# Patient Record
Sex: MALE | Race: WHITE | NOT HISPANIC OR LATINO | Employment: FULL TIME | URBAN - METROPOLITAN AREA
[De-identification: names, ages, dates, MRNs, and addresses within clinical notes are randomized per-mention and may not be internally consistent; named-entity substitution may affect disease eponyms.]

---

## 2019-08-08 ENCOUNTER — OFFICE VISIT (OUTPATIENT)
Dept: FAMILY MEDICINE CLINIC | Facility: CLINIC | Age: 29
End: 2019-08-08
Payer: COMMERCIAL

## 2019-08-08 VITALS
BODY MASS INDEX: 18.51 KG/M2 | HEIGHT: 66 IN | SYSTOLIC BLOOD PRESSURE: 140 MMHG | HEART RATE: 115 BPM | OXYGEN SATURATION: 98 % | DIASTOLIC BLOOD PRESSURE: 86 MMHG | TEMPERATURE: 97.5 F | WEIGHT: 115.2 LBS

## 2019-08-08 DIAGNOSIS — F41.0 PANIC DISORDER: ICD-10-CM

## 2019-08-08 DIAGNOSIS — F41.1 GAD (GENERALIZED ANXIETY DISORDER): Primary | ICD-10-CM

## 2019-08-08 DIAGNOSIS — F17.200 TOBACCO USE DISORDER: ICD-10-CM

## 2019-08-08 DIAGNOSIS — F32.0 CURRENT MILD EPISODE OF MAJOR DEPRESSIVE DISORDER WITHOUT PRIOR EPISODE (HCC): ICD-10-CM

## 2019-08-08 DIAGNOSIS — R11.0 NAUSEA: ICD-10-CM

## 2019-08-08 PROCEDURE — 99204 OFFICE O/P NEW MOD 45 MIN: CPT | Performed by: FAMILY MEDICINE

## 2019-08-08 PROCEDURE — 3008F BODY MASS INDEX DOCD: CPT | Performed by: FAMILY MEDICINE

## 2019-08-08 RX ORDER — SERTRALINE HYDROCHLORIDE 100 MG/1
100 TABLET, FILM COATED ORAL DAILY
Refills: 0 | COMMUNITY
Start: 2019-06-10 | End: 2019-08-08 | Stop reason: SDUPTHER

## 2019-08-08 RX ORDER — ONDANSETRON 4 MG/1
4 TABLET, FILM COATED ORAL EVERY 6 HOURS PRN
Refills: 0 | COMMUNITY
Start: 2019-06-11 | End: 2019-09-05 | Stop reason: SDUPTHER

## 2019-08-08 RX ORDER — PROPRANOLOL HCL 60 MG
60 CAPSULE, EXTENDED RELEASE 24HR ORAL DAILY
Qty: 30 CAPSULE | Refills: 0 | Status: SHIPPED | OUTPATIENT
Start: 2019-08-08 | End: 2019-09-05 | Stop reason: SDUPTHER

## 2019-08-08 RX ORDER — GABAPENTIN 300 MG/1
300 CAPSULE ORAL 3 TIMES DAILY
Refills: 0 | COMMUNITY
Start: 2019-06-10 | End: 2019-08-08 | Stop reason: SDUPTHER

## 2019-08-08 RX ORDER — PROPRANOLOL HCL 60 MG
60 CAPSULE, EXTENDED RELEASE 24HR ORAL DAILY
Refills: 0 | COMMUNITY
Start: 2019-06-10 | End: 2019-08-08 | Stop reason: SDUPTHER

## 2019-08-08 RX ORDER — SERTRALINE HYDROCHLORIDE 100 MG/1
100 TABLET, FILM COATED ORAL DAILY
Qty: 30 TABLET | Refills: 0 | Status: SHIPPED | OUTPATIENT
Start: 2019-08-08 | End: 2019-09-05 | Stop reason: SDUPTHER

## 2019-08-08 RX ORDER — GABAPENTIN 300 MG/1
300 CAPSULE ORAL 3 TIMES DAILY
Qty: 90 CAPSULE | Refills: 0 | Status: SHIPPED | OUTPATIENT
Start: 2019-08-08 | End: 2019-09-05 | Stop reason: SDUPTHER

## 2019-08-08 NOTE — PROGRESS NOTES
Kelly Bajwa 1990 male MRN: [id number]    Family Medicine New Patient    ASSESSMENT/PLAN  Problem List Items Addressed This Visit        Other    LEN (generalized anxiety disorder) - Primary    Relevant Medications    gabapentin (NEURONTIN) 300 mg capsule    propranolol (INDERAL LA) 60 mg 24 hr capsule    sertraline (ZOLOFT) 100 mg tablet    Panic disorder    Relevant Medications    gabapentin (NEURONTIN) 300 mg capsule    propranolol (INDERAL LA) 60 mg 24 hr capsule    sertraline (ZOLOFT) 100 mg tablet    Current mild episode of major depressive disorder without prior episode (HCC)    Relevant Medications    gabapentin (NEURONTIN) 300 mg capsule    propranolol (INDERAL LA) 60 mg 24 hr capsule    sertraline (ZOLOFT) 100 mg tablet    Nausea     Had been using zofran as needed for nausea associated with panic attack         Tobacco use disorder     Encouraged on cessation               Refilled medications as prescribed by [facility]  Paperwork reviewed from his visit there  Had blood work done 6/2019 all normal  Will see him back in 1 month for med check  He will schedule intake with [facility] as well  No future appointments  SUBJECTIVE  CC: No chief complaint on file  HPI:  Kelly Bajwa is a 34 y o  male who presents for establishing care  PMH: anxiety, depression, panic disorder  PSH: denies    Current smoker- 1/2 to 1 PPD for 14 years    Was seen and treated at [facility] clinic on 6/619 for severe depression and SI  States he is doing well since his discharge on medications     HPI    Review of Systems   Constitutional: Negative for chills and fever  HENT: Negative for congestion, postnasal drip, rhinorrhea and sinus pain  Eyes: Negative for photophobia and visual disturbance  Respiratory: Negative for cough and shortness of breath  Cardiovascular: Negative for chest pain, palpitations and leg swelling     Gastrointestinal: Negative for abdominal pain, constipation, diarrhea, nausea and vomiting  Genitourinary: Negative for difficulty urinating and dysuria  Musculoskeletal: Negative for arthralgias and myalgias  Skin: Negative for rash  Neurological: Negative for dizziness and syncope  Historical Information   The patient history was reviewed as follows:    Past Medical History:   Diagnosis Date    Eczema      History reviewed  No pertinent surgical history  History reviewed  No pertinent family history  Social History   Social History     Substance and Sexual Activity   Alcohol Use Not Currently    Frequency: Never     Social History     Substance and Sexual Activity   Drug Use Not Currently    Types: Marijuana     Social History     Tobacco Use   Smoking Status Current Every Day Smoker    Packs/day: 1 00   Smokeless Tobacco Never Used   Tobacco Comment    Smoker/tobacco use - As per [employer]        Medications:     Current Outpatient Medications:     gabapentin (NEURONTIN) 300 mg capsule, Take 1 capsule (300 mg total) by mouth 3 (three) times a day for 30 days, Disp: 90 capsule, Rfl: 0    ondansetron (ZOFRAN) 4 mg tablet, Take 4 mg by mouth every 6 (six) hours as needed, Disp: , Rfl: 0    propranolol (INDERAL LA) 60 mg 24 hr capsule, Take 1 capsule (60 mg total) by mouth daily for 30 days, Disp: 30 capsule, Rfl: 0    sertraline (ZOLOFT) 100 mg tablet, Take 1 tablet (100 mg total) by mouth daily for 30 days, Disp: 30 tablet, Rfl: 0  No Known Allergies    OBJECTIVE    Vitals:   Vitals:    08/08/19 1150   BP: 140/86   BP Location: Left arm   Patient Position: Sitting   Cuff Size: Standard   Pulse: (!) 115   Temp: 97 5 °F (36 4 °C)   TempSrc: Tympanic   SpO2: 98%   Weight: 52 3 kg (115 lb 3 2 oz)   Height: 5' 6" (1 676 m)           Physical Exam   Constitutional: He is oriented to person, place, and time  He appears well-developed and well-nourished  HENT:   Head: Normocephalic and atraumatic     Right Ear: External ear normal    Left Ear: External ear normal    Mouth/Throat: Oropharynx is clear and moist    Eyes: Pupils are equal, round, and reactive to light  Conjunctivae and EOM are normal    Neck: Normal range of motion  Neck supple  Cardiovascular: Normal rate, regular rhythm and normal heart sounds  No murmur heard  Pulmonary/Chest: Effort normal and breath sounds normal  No respiratory distress  He has no wheezes  Abdominal: Soft  Bowel sounds are normal  He exhibits no distension  Musculoskeletal: Normal range of motion  He exhibits no edema  Neurological: He is alert and oriented to person, place, and time  Skin: Skin is warm and dry  Psychiatric: He has a normal mood and affect   His behavior is normal                 Gloria Reina DO    8/8/2019

## 2019-09-05 ENCOUNTER — OFFICE VISIT (OUTPATIENT)
Dept: FAMILY MEDICINE CLINIC | Facility: CLINIC | Age: 29
End: 2019-09-05
Payer: COMMERCIAL

## 2019-09-05 VITALS
BODY MASS INDEX: 18.84 KG/M2 | OXYGEN SATURATION: 98 % | SYSTOLIC BLOOD PRESSURE: 122 MMHG | HEIGHT: 66 IN | HEART RATE: 87 BPM | WEIGHT: 117.2 LBS | TEMPERATURE: 97.8 F | DIASTOLIC BLOOD PRESSURE: 84 MMHG

## 2019-09-05 DIAGNOSIS — F41.1 GAD (GENERALIZED ANXIETY DISORDER): ICD-10-CM

## 2019-09-05 DIAGNOSIS — F41.0 PANIC DISORDER: ICD-10-CM

## 2019-09-05 DIAGNOSIS — F17.200 TOBACCO USE DISORDER: ICD-10-CM

## 2019-09-05 DIAGNOSIS — Z23 NEED FOR 23-POLYVALENT PNEUMOCOCCAL POLYSACCHARIDE VACCINE: ICD-10-CM

## 2019-09-05 DIAGNOSIS — F32.0 CURRENT MILD EPISODE OF MAJOR DEPRESSIVE DISORDER WITHOUT PRIOR EPISODE (HCC): Primary | ICD-10-CM

## 2019-09-05 DIAGNOSIS — R11.0 NAUSEA: ICD-10-CM

## 2019-09-05 PROCEDURE — 99214 OFFICE O/P EST MOD 30 MIN: CPT | Performed by: FAMILY MEDICINE

## 2019-09-05 PROCEDURE — 3008F BODY MASS INDEX DOCD: CPT | Performed by: FAMILY MEDICINE

## 2019-09-05 PROCEDURE — 90471 IMMUNIZATION ADMIN: CPT | Performed by: FAMILY MEDICINE

## 2019-09-05 PROCEDURE — 90732 PPSV23 VACC 2 YRS+ SUBQ/IM: CPT | Performed by: FAMILY MEDICINE

## 2019-09-05 RX ORDER — DESOXIMETASONE 2.5 MG/G
CREAM TOPICAL 2 TIMES DAILY
COMMUNITY

## 2019-09-05 RX ORDER — SERTRALINE HYDROCHLORIDE 100 MG/1
100 TABLET, FILM COATED ORAL DAILY
Qty: 90 TABLET | Refills: 0 | Status: SHIPPED | OUTPATIENT
Start: 2019-09-05 | End: 2019-12-05 | Stop reason: SDUPTHER

## 2019-09-05 RX ORDER — PROPRANOLOL HCL 60 MG
60 CAPSULE, EXTENDED RELEASE 24HR ORAL DAILY
Qty: 90 CAPSULE | Refills: 0 | Status: SHIPPED | OUTPATIENT
Start: 2019-09-05 | End: 2019-12-05 | Stop reason: SDUPTHER

## 2019-09-05 RX ORDER — ONDANSETRON 4 MG/1
4 TABLET, FILM COATED ORAL EVERY 6 HOURS PRN
Qty: 20 TABLET | Refills: 0 | Status: SHIPPED | OUTPATIENT
Start: 2019-09-05 | End: 2019-12-05 | Stop reason: SDUPTHER

## 2019-09-05 RX ORDER — GABAPENTIN 300 MG/1
300 CAPSULE ORAL 3 TIMES DAILY
Qty: 270 CAPSULE | Refills: 0 | Status: SHIPPED | OUTPATIENT
Start: 2019-09-05 | End: 2019-12-05 | Stop reason: SDUPTHER

## 2019-09-05 NOTE — PROGRESS NOTES
Eduard Coronado 1990 male MRN: [id number]    Family Medicine Follow-up Visit    ASSESSMENT/PLAN  Problem List Items Addressed This Visit        Other    LEN (generalized anxiety disorder)    Relevant Medications    sertraline (ZOLOFT) 100 mg tablet    propranolol (INDERAL LA) 60 mg 24 hr capsule    gabapentin (NEURONTIN) 300 mg capsule    Panic disorder    Relevant Medications    sertraline (ZOLOFT) 100 mg tablet    propranolol (INDERAL LA) 60 mg 24 hr capsule    gabapentin (NEURONTIN) 300 mg capsule    Current mild episode of major depressive disorder without prior episode (HCC) - Primary    Relevant Medications    sertraline (ZOLOFT) 100 mg tablet    propranolol (INDERAL LA) 60 mg 24 hr capsule    gabapentin (NEURONTIN) 300 mg capsule    Nausea    Relevant Medications    ondansetron (ZOFRAN) 4 mg tablet    Tobacco use disorder    Need for 23-polyvalent pneumococcal polysaccharide vaccine    Relevant Orders    PNEUMOCOCCAL POLYSACCHARIDE VACCINE 23-VALENT =>3YO SQ IM (Completed)          Patient is doing very well now that he is back on medications as prescribed when he was in  [facility]  We are continuing prescriptions until he can be seen by psych  He has intake apt with [facility] later this month  Refills provided today  His blood work is UTD  He will work on cutting back smoking  If GI symptoms persist he will RTC sooner  Otherwise follow up in 3 months  Future Appointments   Date Time Provider Mikey Clifton   9/23/2019  2:00 PM Ricardo Vasquez [facility]   12/5/2019 11:30 AM DO [facility]          SUBJECTIVE  CC: Follow-up (4 week )      HPI:  Eduard Coronado is a 34 y o  male who presents for follow up  Doing well on medication- he feels much better than before  Has apt with [facility] later this month  Still using zofran occasionally but feeling better        HPI    Review of Systems   Constitutional: Negative for chills and fever     HENT: Negative for congestion, postnasal drip, rhinorrhea and sinus pain  Eyes: Negative for photophobia and visual disturbance  Respiratory: Negative for cough and shortness of breath  Cardiovascular: Negative for chest pain, palpitations and leg swelling  Gastrointestinal: Positive for nausea  Negative for abdominal pain, constipation, diarrhea and vomiting  Genitourinary: Negative for difficulty urinating and dysuria  Musculoskeletal: Negative for arthralgias and myalgias  Skin: Negative for rash  Neurological: Negative for dizziness and syncope  Historical Information   The patient history was reviewed as follows:    Past Medical History:   Diagnosis Date    Eczema      History reviewed  No pertinent surgical history  History reviewed  No pertinent family history     Social History   Social History     Substance and Sexual Activity   Alcohol Use Not Currently    Frequency: Never     Social History     Substance and Sexual Activity   Drug Use Not Currently    Types: Marijuana     Social History     Tobacco Use   Smoking Status Current Every Day Smoker    Packs/day: 1 00   Smokeless Tobacco Never Used   Tobacco Comment    Smoker/tobacco use - As per [employer]        Medications:     Current Outpatient Medications:     desoximetasone (TOPICORT) 0 25 % cream, Apply topically 2 (two) times a day, Disp: , Rfl:     gabapentin (NEURONTIN) 300 mg capsule, Take 1 capsule (300 mg total) by mouth 3 (three) times a day for 90 days, Disp: 270 capsule, Rfl: 0    ondansetron (ZOFRAN) 4 mg tablet, Take 1 tablet (4 mg total) by mouth every 6 (six) hours as needed for nausea or vomiting, Disp: 20 tablet, Rfl: 0    propranolol (INDERAL LA) 60 mg 24 hr capsule, Take 1 capsule (60 mg total) by mouth daily for 90 days, Disp: 90 capsule, Rfl: 0    sertraline (ZOLOFT) 100 mg tablet, Take 1 tablet (100 mg total) by mouth daily for 90 days, Disp: 90 tablet, Rfl: 0  No Known Allergies    OBJECTIVE    Vitals: Vitals:    09/05/19 1158   BP: 122/84   BP Location: Left arm   Patient Position: Sitting   Cuff Size: Standard   Pulse: 87   Temp: 97 8 °F (36 6 °C)   TempSrc: Tympanic   SpO2: 98%   Weight: 53 2 kg (117 lb 3 2 oz)   Height: 5' 6" (1 676 m)           Physical Exam   Constitutional: He is oriented to person, place, and time  He appears well-developed and well-nourished  HENT:   Head: Normocephalic and atraumatic  Right Ear: External ear normal    Left Ear: External ear normal    Mouth/Throat: Oropharynx is clear and moist    Eyes: Pupils are equal, round, and reactive to light  Conjunctivae and EOM are normal    Neck: Normal range of motion  Neck supple  Cardiovascular: Normal rate, regular rhythm and normal heart sounds  No murmur heard  Pulmonary/Chest: Effort normal and breath sounds normal  No respiratory distress  He has no wheezes  Abdominal: Soft  Bowel sounds are normal  He exhibits no distension  Musculoskeletal: Normal range of motion  He exhibits no edema  Neurological: He is alert and oriented to person, place, and time  Skin: Skin is warm and dry  Psychiatric: He has a normal mood and affect   His behavior is normal           DO Paul    9/5/2019

## 2019-09-23 ENCOUNTER — SOCIAL WORK (OUTPATIENT)
Dept: BEHAVIORAL/MENTAL HEALTH CLINIC | Facility: CLINIC | Age: 29
End: 2019-09-23
Payer: COMMERCIAL

## 2019-09-23 ENCOUNTER — DOCUMENTATION (OUTPATIENT)
Dept: BEHAVIORAL/MENTAL HEALTH CLINIC | Facility: CLINIC | Age: 29
End: 2019-09-23

## 2019-09-23 DIAGNOSIS — F33.1 MODERATE RECURRENT MAJOR DEPRESSION (HCC): Primary | ICD-10-CM

## 2019-09-23 DIAGNOSIS — F41.1 GAD (GENERALIZED ANXIETY DISORDER): ICD-10-CM

## 2019-09-23 PROCEDURE — 90834 PSYTX W PT 45 MINUTES: CPT | Performed by: SOCIAL WORKER
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
## 2019-09-23 NOTE — PROGRESS NOTES
This writer consulted with PCP Dr Tori Guzman regarding her willingness to continue to prescribe Misael's medications at this time  Dr Tori Guzman communicated that after review of his medications she is comfortable managing his medication as long as his mental health symptoms are well controlled  Agreed to be in contact regarding psychiatry referral in the future if needed

## 2019-12-05 ENCOUNTER — OFFICE VISIT (OUTPATIENT)
Dept: FAMILY MEDICINE CLINIC | Facility: CLINIC | Age: 29
End: 2019-12-05
Payer: COMMERCIAL

## 2019-12-05 VITALS
DIASTOLIC BLOOD PRESSURE: 80 MMHG | BODY MASS INDEX: 18.48 KG/M2 | OXYGEN SATURATION: 96 % | SYSTOLIC BLOOD PRESSURE: 128 MMHG | WEIGHT: 115 LBS | HEART RATE: 86 BPM | HEIGHT: 66 IN | TEMPERATURE: 96.8 F

## 2019-12-05 DIAGNOSIS — F41.1 GAD (GENERALIZED ANXIETY DISORDER): ICD-10-CM

## 2019-12-05 DIAGNOSIS — Z11.4 SCREENING FOR HUMAN IMMUNODEFICIENCY VIRUS: ICD-10-CM

## 2019-12-05 DIAGNOSIS — F33.1 MODERATE RECURRENT MAJOR DEPRESSION (HCC): ICD-10-CM

## 2019-12-05 DIAGNOSIS — Z13.6 SCREENING FOR CARDIOVASCULAR CONDITION: ICD-10-CM

## 2019-12-05 DIAGNOSIS — F32.0 CURRENT MILD EPISODE OF MAJOR DEPRESSIVE DISORDER WITHOUT PRIOR EPISODE (HCC): Primary | ICD-10-CM

## 2019-12-05 DIAGNOSIS — R11.0 NAUSEA: ICD-10-CM

## 2019-12-05 DIAGNOSIS — F41.0 PANIC DISORDER: ICD-10-CM

## 2019-12-05 DIAGNOSIS — F17.200 TOBACCO USE DISORDER: ICD-10-CM

## 2019-12-05 PROCEDURE — 99214 OFFICE O/P EST MOD 30 MIN: CPT | Performed by: FAMILY MEDICINE

## 2019-12-05 PROCEDURE — 3008F BODY MASS INDEX DOCD: CPT | Performed by: FAMILY MEDICINE

## 2019-12-05 RX ORDER — ONDANSETRON 4 MG/1
4 TABLET, FILM COATED ORAL EVERY 6 HOURS PRN
Qty: 20 TABLET | Refills: 0 | Status: SHIPPED | OUTPATIENT
Start: 2019-12-05 | End: 2020-03-12 | Stop reason: SDUPTHER

## 2019-12-05 RX ORDER — PROPRANOLOL HCL 60 MG
60 CAPSULE, EXTENDED RELEASE 24HR ORAL DAILY
Qty: 90 CAPSULE | Refills: 0 | Status: SHIPPED | OUTPATIENT
Start: 2019-12-05 | End: 2020-03-12 | Stop reason: SDUPTHER

## 2019-12-05 RX ORDER — GABAPENTIN 300 MG/1
300 CAPSULE ORAL 3 TIMES DAILY
Qty: 270 CAPSULE | Refills: 0 | Status: SHIPPED | OUTPATIENT
Start: 2019-12-05 | End: 2020-03-12 | Stop reason: SDUPTHER

## 2019-12-05 RX ORDER — SERTRALINE HYDROCHLORIDE 100 MG/1
100 TABLET, FILM COATED ORAL DAILY
Qty: 90 TABLET | Refills: 0 | Status: SHIPPED | OUTPATIENT
Start: 2019-12-05 | End: 2020-03-12 | Stop reason: SDUPTHER

## 2019-12-05 NOTE — PROGRESS NOTES
Jihan Phylicia 1990 male MRN: [id number]    Family Medicine Follow-up Visit    ASSESSMENT/PLAN  Problem List Items Addressed This Visit        Other    LEN (generalized anxiety disorder)    Relevant Medications    sertraline (ZOLOFT) 100 mg tablet    gabapentin (NEURONTIN) 300 mg capsule    propranolol (INDERAL LA) 60 mg 24 hr capsule    Other Relevant Orders    CBC and differential    Comprehensive metabolic panel    Lipid panel    TSH, 3rd generation with Free T4 reflex    UA (URINE) with reflex to Scope    Panic disorder    Relevant Medications    sertraline (ZOLOFT) 100 mg tablet    gabapentin (NEURONTIN) 300 mg capsule    propranolol (INDERAL LA) 60 mg 24 hr capsule    Other Relevant Orders    CBC and differential    Comprehensive metabolic panel    Lipid panel    TSH, 3rd generation with Free T4 reflex    UA (URINE) with reflex to Scope    Current mild episode of major depressive disorder without prior episode (HCC) - Primary    Relevant Medications    sertraline (ZOLOFT) 100 mg tablet    gabapentin (NEURONTIN) 300 mg capsule    propranolol (INDERAL LA) 60 mg 24 hr capsule    Other Relevant Orders    CBC and differential    Comprehensive metabolic panel    Lipid panel    TSH, 3rd generation with Free T4 reflex    UA (URINE) with reflex to Scope    Nausea    Relevant Medications    ondansetron (ZOFRAN) 4 mg tablet    Tobacco use disorder    Relevant Orders    CBC and differential    Comprehensive metabolic panel    Lipid panel    TSH, 3rd generation with Free T4 reflex    UA (URINE) with reflex to Scope    Moderate recurrent major depression (HCC)    Relevant Medications    sertraline (ZOLOFT) 100 mg tablet    Screening for cardiovascular condition    Relevant Orders    CBC and differential    Comprehensive metabolic panel    Lipid panel    TSH, 3rd generation with Free T4 reflex    UA (URINE) with reflex to Scope      Other Visit Diagnoses     Screening for human immunodeficiency virus        Relevant Orders    Human Immunodeficiency Virus 1/2 Antigen / Antibody ( Fourth Generation) with Reflex Testing               Follow up in 6 months for CP and get lab work done prior to next visit  Future Appointments   Date Time Provider Mikey Clifton   6/11/2020 10:00 AM DO [facility]          SUBJECTIVE  CC: Follow-up (3 month medication )      HPI:  Carly Alberto is a 34 y o  male who presents for follow up  He is doing well on medications, taking as prescribed  Spoke with Renny Robertson since our last visit and that went well  Tobacco use- still using about a pack per day  Has used patch and gum before which did not work well  HPI    Review of Systems   Constitutional: Negative for chills and fever  HENT: Negative for congestion, postnasal drip, rhinorrhea and sinus pain  Eyes: Negative for photophobia and visual disturbance  Respiratory: Negative for cough and shortness of breath  Cardiovascular: Negative for chest pain, palpitations and leg swelling  Gastrointestinal: Negative for abdominal pain, constipation, diarrhea, nausea and vomiting  Genitourinary: Negative for difficulty urinating and dysuria  Musculoskeletal: Negative for arthralgias and myalgias  Skin: Negative for rash  Neurological: Negative for dizziness and syncope  Historical Information   The patient history was reviewed as follows:    Past Medical History:   Diagnosis Date    Anxiety     Chicken pox     Depression     Eczema      History reviewed  No pertinent surgical history  History reviewed  No pertinent family history     Social History   Social History     Substance and Sexual Activity   Alcohol Use Not Currently    Frequency: Never     Social History     Substance and Sexual Activity   Drug Use Not Currently    Types: Marijuana     Social History     Tobacco Use   Smoking Status Current Every Day Smoker    Packs/day: 1 00   Smokeless Tobacco Never Used   Tobacco Comment    Smoker/tobacco use - As per [employer]        Medications:     Current Outpatient Medications:     desoximetasone (TOPICORT) 0 25 % cream, Apply topically 2 (two) times a day, Disp: , Rfl:     gabapentin (NEURONTIN) 300 mg capsule, Take 1 capsule (300 mg total) by mouth 3 (three) times a day, Disp: 270 capsule, Rfl: 0    propranolol (INDERAL LA) 60 mg 24 hr capsule, Take 1 capsule (60 mg total) by mouth daily, Disp: 90 capsule, Rfl: 0    sertraline (ZOLOFT) 100 mg tablet, Take 1 tablet (100 mg total) by mouth daily, Disp: 90 tablet, Rfl: 0    ondansetron (ZOFRAN) 4 mg tablet, Take 1 tablet (4 mg total) by mouth every 6 (six) hours as needed for nausea or vomiting, Disp: 20 tablet, Rfl: 0  No Known Allergies    OBJECTIVE    Vitals:   Vitals:    12/05/19 1118   BP: 128/80   BP Location: Left arm   Patient Position: Sitting   Cuff Size: Large   Pulse: 86   Temp: (!) 96 8 °F (36 °C)   TempSrc: Tympanic   SpO2: 96%   Weight: 52 2 kg (115 lb)   Height: 5' 6" (1 676 m)           Physical Exam   Constitutional: He is oriented to person, place, and time  He appears well-developed and well-nourished  HENT:   Head: Normocephalic and atraumatic  Right Ear: External ear normal    Left Ear: External ear normal    Mouth/Throat: Oropharynx is clear and moist    Eyes: Pupils are equal, round, and reactive to light  Conjunctivae and EOM are normal    Neck: Normal range of motion  Neck supple  Cardiovascular: Normal rate, regular rhythm and normal heart sounds  No murmur heard  Pulmonary/Chest: Effort normal and breath sounds normal  No respiratory distress  He has no wheezes  Abdominal: Soft  Bowel sounds are normal  He exhibits no distension  Musculoskeletal: Normal range of motion  He exhibits no edema  Neurological: He is alert and oriented to person, place, and time  Skin: Skin is warm and dry  Psychiatric: He has a normal mood and affect   His behavior is normal               [facility] [address] [place] [address],     12/5/2019

## 2020-03-12 DIAGNOSIS — R11.0 NAUSEA: ICD-10-CM

## 2020-03-12 DIAGNOSIS — F41.1 GAD (GENERALIZED ANXIETY DISORDER): ICD-10-CM

## 2020-03-12 DIAGNOSIS — F41.0 PANIC DISORDER: ICD-10-CM

## 2020-03-12 DIAGNOSIS — F32.0 CURRENT MILD EPISODE OF MAJOR DEPRESSIVE DISORDER WITHOUT PRIOR EPISODE (HCC): ICD-10-CM

## 2020-03-12 RX ORDER — PROPRANOLOL HCL 60 MG
60 CAPSULE, EXTENDED RELEASE 24HR ORAL DAILY
Qty: 90 CAPSULE | Refills: 1 | Status: SHIPPED | OUTPATIENT
Start: 2020-03-12 | End: 2020-12-01 | Stop reason: SDUPTHER

## 2020-03-12 RX ORDER — GABAPENTIN 300 MG/1
300 CAPSULE ORAL 3 TIMES DAILY
Qty: 270 CAPSULE | Refills: 1 | Status: SHIPPED | OUTPATIENT
Start: 2020-03-12 | End: 2020-12-01 | Stop reason: SDUPTHER

## 2020-03-12 RX ORDER — ONDANSETRON 4 MG/1
4 TABLET, FILM COATED ORAL EVERY 6 HOURS PRN
Qty: 20 TABLET | Refills: 0 | Status: SHIPPED | OUTPATIENT
Start: 2020-03-12 | End: 2020-12-01 | Stop reason: SDUPTHER

## 2020-03-12 RX ORDER — SERTRALINE HYDROCHLORIDE 100 MG/1
100 TABLET, FILM COATED ORAL DAILY
Qty: 90 TABLET | Refills: 1 | Status: SHIPPED | OUTPATIENT
Start: 2020-03-12 | End: 2020-12-01 | Stop reason: SDUPTHER

## 2020-12-01 DIAGNOSIS — F41.0 PANIC DISORDER: ICD-10-CM

## 2020-12-01 DIAGNOSIS — F41.1 GAD (GENERALIZED ANXIETY DISORDER): ICD-10-CM

## 2020-12-01 DIAGNOSIS — F32.0 CURRENT MILD EPISODE OF MAJOR DEPRESSIVE DISORDER WITHOUT PRIOR EPISODE (HCC): ICD-10-CM

## 2020-12-01 DIAGNOSIS — R11.0 NAUSEA: ICD-10-CM

## 2020-12-01 RX ORDER — PROPRANOLOL HCL 60 MG
60 CAPSULE, EXTENDED RELEASE 24HR ORAL DAILY
Qty: 90 CAPSULE | Refills: 1 | Status: SHIPPED | OUTPATIENT
Start: 2020-12-01 | End: 2022-07-29

## 2020-12-01 RX ORDER — GABAPENTIN 300 MG/1
300 CAPSULE ORAL 3 TIMES DAILY
Qty: 270 CAPSULE | Refills: 1 | Status: SHIPPED | OUTPATIENT
Start: 2020-12-01 | End: 2022-07-29

## 2020-12-01 RX ORDER — SERTRALINE HYDROCHLORIDE 100 MG/1
100 TABLET, FILM COATED ORAL DAILY
Qty: 90 TABLET | Refills: 1 | Status: SHIPPED | OUTPATIENT
Start: 2020-12-01 | End: 2022-07-29

## 2020-12-01 RX ORDER — ONDANSETRON 4 MG/1
4 TABLET, FILM COATED ORAL EVERY 6 HOURS PRN
Qty: 20 TABLET | Refills: 0 | Status: SHIPPED | OUTPATIENT
Start: 2020-12-01

## 2021-01-22 ENCOUNTER — TELEPHONE (OUTPATIENT)
Dept: OTHER | Facility: OTHER | Age: 31
End: 2021-01-22

## 2021-01-22 NOTE — TELEPHONE ENCOUNTER
Patient called to Reschedule his Appointment  He missed his Appointment on 12-  He would like the office to call him to Reschedule   Thank You

## 2021-04-24 ENCOUNTER — IMMUNIZATIONS (OUTPATIENT)
Dept: FAMILY MEDICINE CLINIC | Facility: HOSPITAL | Age: 31
End: 2021-04-24

## 2021-04-24 DIAGNOSIS — Z23 ENCOUNTER FOR IMMUNIZATION: Primary | ICD-10-CM

## 2021-04-24 PROCEDURE — 91300 SARS-COV-2 / COVID-19 MRNA VACCINE (PFIZER-BIONTECH) 30 MCG: CPT

## 2021-04-24 PROCEDURE — 0001A SARS-COV-2 / COVID-19 MRNA VACCINE (PFIZER-BIONTECH) 30 MCG: CPT

## 2021-05-25 ENCOUNTER — IMMUNIZATIONS (OUTPATIENT)
Dept: FAMILY MEDICINE CLINIC | Facility: HOSPITAL | Age: 31
End: 2021-05-25

## 2021-05-25 DIAGNOSIS — Z23 ENCOUNTER FOR IMMUNIZATION: Primary | ICD-10-CM

## 2021-05-25 PROCEDURE — 91300 SARS-COV-2 / COVID-19 MRNA VACCINE (PFIZER-BIONTECH) 30 MCG: CPT

## 2021-05-25 PROCEDURE — 0002A SARS-COV-2 / COVID-19 MRNA VACCINE (PFIZER-BIONTECH) 30 MCG: CPT

## 2022-07-08 ENCOUNTER — TELEPHONE (OUTPATIENT)
Dept: FAMILY MEDICINE CLINIC | Facility: CLINIC | Age: 32
End: 2022-07-08

## 2022-07-08 DIAGNOSIS — R21 RASH AND NONSPECIFIC SKIN ERUPTION: Primary | ICD-10-CM

## 2022-07-08 RX ORDER — TRIAMCINOLONE ACETONIDE 1 MG/G
CREAM TOPICAL 2 TIMES DAILY
Qty: 30 G | Refills: 0 | Status: SHIPPED | OUTPATIENT
Start: 2022-07-08

## 2022-07-08 NOTE — TELEPHONE ENCOUNTER
Yazan Navarrete called to see if you can please fill a script for his cream- Triamcinolone 0 1% for his psoriasis and he has had a very bad flare up and did make an appt for his Physical   Is  This something you can do for him?  He uses [facility] in [place]  Please send to [place] or Chris Johnson   Thank you

## 2022-07-29 RX ORDER — HYDROXYZINE 50 MG/1
TABLET, FILM COATED ORAL
COMMUNITY
Start: 2022-06-24 | End: 2023-02-01 | Stop reason: SDUPTHER

## 2022-07-29 RX ORDER — TRAZODONE HYDROCHLORIDE 100 MG/1
100 TABLET ORAL
COMMUNITY
Start: 2022-06-01 | End: 2023-02-01

## 2022-09-22 DIAGNOSIS — R21 RASH AND NONSPECIFIC SKIN ERUPTION: Primary | ICD-10-CM

## 2022-09-22 RX ORDER — DESOXIMETASONE 2.5 MG/G
CREAM TOPICAL 2 TIMES DAILY
Qty: 30 G | Refills: 0 | Status: SHIPPED | OUTPATIENT
Start: 2022-09-22

## 2022-09-22 NOTE — TELEPHONE ENCOUNTER
Patient lost his insurance  He has new insurance now and rescheduled his appointment for physical     Patient is asking for a refill of medication      Thank you

## 2022-10-06 ENCOUNTER — OFFICE VISIT (OUTPATIENT)
Dept: DERMATOLOGY | Facility: CLINIC | Age: 32
End: 2022-10-06
Payer: COMMERCIAL

## 2022-10-06 ENCOUNTER — TELEPHONE (OUTPATIENT)
Dept: DERMATOLOGY | Facility: CLINIC | Age: 32
End: 2022-10-06

## 2022-10-06 VITALS — WEIGHT: 120 LBS | BODY MASS INDEX: 19.29 KG/M2 | HEIGHT: 66 IN | TEMPERATURE: 98.9 F

## 2022-10-06 DIAGNOSIS — L30.8 OTHER ECZEMA: Primary | ICD-10-CM

## 2022-10-06 PROCEDURE — 99204 OFFICE O/P NEW MOD 45 MIN: CPT | Performed by: DERMATOLOGY

## 2022-10-06 RX ORDER — DULOXETIN HYDROCHLORIDE 60 MG/1
CAPSULE, DELAYED RELEASE ORAL
COMMUNITY
Start: 2022-09-27

## 2022-10-06 RX ORDER — GABAPENTIN 400 MG/1
400 CAPSULE ORAL 3 TIMES DAILY
COMMUNITY
Start: 2022-09-09

## 2022-10-06 RX ORDER — CLOBETASOL PROPIONATE 0.5 MG/G
OINTMENT TOPICAL
Qty: 60 G | Refills: 3 | Status: SHIPPED | OUTPATIENT
Start: 2022-10-06

## 2022-10-06 RX ORDER — TACROLIMUS 0.3 MG/G
OINTMENT TOPICAL
Qty: 100 G | Refills: 3 | Status: SHIPPED | OUTPATIENT
Start: 2022-10-06

## 2022-10-06 RX ORDER — PRAZOSIN HYDROCHLORIDE 1 MG/1
1 CAPSULE ORAL
COMMUNITY
Start: 2022-09-27

## 2022-10-06 NOTE — PROGRESS NOTES
[facility] Note     Patient Name: Juan Manuel Kirby  Encounter Date: 10/6/2022     Have you been cared for by a [facility] Dermatologist in the last 3 years and, if so, which one? No    · Have you traveled outside of the [address] [place] [address] in the past 3 months or outside of the [facility] area in the last 2 weeks? No     May we call your Preferred Phone number to discuss your specific medical information? Yes     May we leave a detailed message that includes your specific medical information? Yes      Today's Chief Concerns:   Concern #1:  Eczema    Past Medical History:  Have you personally ever had or currently have any of the following? · Skin cancer (such as Melanoma, Basal Cell Carcinoma, Squamous Cell Carcinoma? (If Yes, please provide more detail)- No  · Eczema: YES  · Psoriasis: No  · HIV/AIDS: No  · Hepatitis B or C: No  · Tuberculosis: No  · Systemic Immunosuppression such as Diabetes, Biologic or Immunotherapy, Chemotherapy, Organ Transplantation, Bone Marrow Transplantation (If YES, please provide more detail): No  · Radiation Treatment (If YES, please provide more detail): No  · Any other major medical conditions/concerns? (If Yes, which types)- No      Family History:  Have any of your "first degree relatives" (parent, brother, sister, or child) had any of the following       · Skin cancer such as Melanoma or Merkel Cell Carcinoma or Pancreatic Cancer? No  · Eczema, Asthma, Hay Fever or Seasonal Allergies: No  · Psoriasis or Psoriatic Arthritis: No  · Do any other medical conditions seem to run in your family? If Yes, what condition and which relatives?   No    Current Medications:       Current Outpatient Medications:     clobetasol (TEMOVATE) 0 05 % ointment, Apply twice a day to affected areas on hands and feet , Disp: 60 g, Rfl: 3    desoximetasone (TOPICORT) 0 25 % cream, Apply topically 2 (two) times a day, Disp: 30 g, Rfl: 0    DULoxetine (CYMBALTA) 60 mg delayed release capsule, TAKE 1 CAPSULE BY MOUTH ONCE DAILY WITH FOOD IN THE MORNING, Disp: , Rfl:     gabapentin (NEURONTIN) 400 mg capsule, Take 400 mg by mouth 3 (three) times a day, Disp: , Rfl:     hydrOXYzine HCL (ATARAX) 50 mg tablet, , Disp: , Rfl:     ondansetron (ZOFRAN) 4 mg tablet, Take 1 tablet (4 mg total) by mouth every 6 (six) hours as needed for nausea or vomiting, Disp: 20 tablet, Rfl: 0    prazosin (MINIPRESS) 1 mg capsule, Take 1 mg by mouth daily at bedtime, Disp: , Rfl:     sertraline (ZOLOFT) 50 mg tablet, , Disp: , Rfl:     tacrolimus (PROTOPIC) 0 03 % ointment, Apply twice a day to the face , Disp: 100 g, Rfl: 3    traZODone (DESYREL) 100 mg tablet, Take 100 mg by mouth daily at bedtime, Disp: , Rfl:     triamcinolone (KENALOG) 0 1 % cream, Apply topically 2 (two) times a day, Disp: 30 g, Rfl: 0    triamcinolone (KENALOG) 0 1 % ointment, Apply up to four times a day to affected areas on the skin; AVOID face and groin , Disp: 453 6 g, Rfl: 5      Review of Systems:  Have you recently had or currently have any of the following? If YES, what are you doing for the problem? · Fever, chills or unintended weight loss: No  · Sudden loss or change in your vision: No  · Nausea, vomiting or blood in your stool: No  · Painful or swollen joints: No  · Wheezing or cough: No  · Changing mole or non-healing wound: No  · Nosebleeds: YES, Skin is dry on the inside of nostril  · Excessive sweating: YES,   · Easy or prolonged bleeding? No  · Over the last 2 weeks, how often have you been bothered by the following problems? · Taking little interest or pleasure in doing things: 2 - Several days  · Feeling down, depressed, or hopeless: 2 - Several days  Rapid heartbeat with epinephrine:  No  · Any known allergies?       No Known Allergies      Physical Exam:     Was a chaperone (Derm Clinical Assistant) present throughout the entire Physical Exam? Yes     Did the Dermatology Team specifically  the patient on the importance of a Full Skin Exam to be sure that nothing is missed clinically?  Yes}  o Did the patient ultimately request or accept a Full Skin Exam?  Yes  o Did the patient specifically refuse to have the areas "under-the-bra" examined by the Dermatologist? No  o Did the patient specifically refuse to have the areas "under-the-underwear" examined by the Dermatologist? No    CONSTITUTIONAL:   Vitals:    10/06/22 0826   Temp: 98 9 °F (37 2 °C)   TempSrc: Temporal   Weight: 54 4 kg (120 lb)   Height: 5' 6" (1 676 m)       PSYCH: Normal mood and affect  EYES: Normal conjunctiva  ENT: Normal lips and oral mucosa  CARDIOVASCULAR: No edema  RESPIRATORY: Normal respirations  HEME/LYMPH/IMMUNO:  No regional lymphadenopathy except as noted below in "ASSESSMENT AND PLAN BY DIAGNOSIS"    SKIN:  FULL ORGAN SYSTEM EXAM   Hair, Scalp, Ears, Face Normal except as noted below in Assessment   Neck, Cervical Chain Nodes Normal except as noted below in Assessment   Right Arm/Hand/Fingers Normal except as noted below in Assessment   Left Arm/Hand/Fingers Normal except as noted below in Assessment   Chest/Breasts/Axillae Viewed areas Normal except as noted below in Assessment   Abdomen, Umbilicus Normal except as noted below in Assessment   Back/Spine Normal except as noted below in Assessment   Right Leg, Foot, Toes Normal except as noted below in Assessment   Left Leg, Foot, Toes Normal except as noted below in Assessment        Assessment and Plan by Diagnosis:    History of Present Condition:     Duration:  How long has this been an issue for you?    o  June started getting worse  o 9 years for diagnosis w/hands   Location Affected:  Where on the body is this affecting you?    o  Hands/fingers, arms, face, legs, feet, abdomen    Quality:  Is there any bleeding, pain, itch, burning/irritation, or redness associated with the skin lesion?    o  Itching, burning, irritation, bleeding, redness   Severity:  Describe any bleeding, pain, itch, burning/irritation, or redness on a scale of 1 to 10 (with 10 being the worst)  o  10   Timing:  Does this condition seem to be there pretty constantly or do you notice it more at specific times throughout the day?    o  Constant   Context:  Have you ever noticed that this condition seems to be associated with specific activities you do?    o  Denies   Modifying Factors:    o Anything that seems to make the condition worse?    -  Friction  - Water   o What have you tried to do to make the condition better? -  Over the counter shower washes  - Over the counter creams/lotion  - Triamcinolone 0 1% cream  - Desoximetasone 0 25% cream   Associated Signs and Symptoms:  Does this skin lesion seem to be associated with any of the following:  o  SL AMB DERM SIGNS AND SYMPTOMS: Redness, Itching and Scratching, Bleeding, Skin color changes and Pus or Discharge     1  ATOPIC DERMATITIS ("childhood Eczema")    Physical Exam:   Anatomic Location Affected:  Diffuse   Morphological Description:  Erythematous patches with scale   Body Surface Area Today:  50%   Overall Severity: moderate                                    Additional History of Present Condition:  Patient stated he was diagnosed about 9 years ago with eczema on the hands  Patient stated in June 2022 eczema has flared worse and has spread to all parts of the body, including; feet, legs, neck, face, ears, abdomen  Patient complains of itching, bleeding, burning, irritation, redness  Patient has tried and failed over the counter eczema creams, Triamcinolone 0 25% cream, and Desoximetasone 0 25% cream  Patient stated his eczema flares in weather, warm/hot showers, sweating, and friction       Assessment and Plan:  Based on a thorough discussion of this condition and the management approach to it (including a comprehensive discussion of the known risks, side effects and potential benefits of treatment), the patient (family) agrees to implement the following specific plan:     Discussed STOP the use of Amlactin lotion, Goldbond, salicylic acid shampoo   Continue using the Cerave moisturizing cream, and Cerave wash that was used for showering   Discussed treatment options such as; Phototherapy, oral biologics, injectable biologics and topical steroids   - Discussed starting the PA for Dupixent  Patient was informed that this can take at least 30 days to get approved   Start to use Triamcinolone 0 1% ointment  Apply up to four times a day to all affected areas on the ARMS, LEGS, NECK, ABDOMEN for only 2 weeks at a time  For the face  Apply twice a day for ONLY 7 DAYS and than STOP  This is a steroid topical and can thin your skin out if you DO NOT take a break in between using after the 14 days   For the face AFTER 7 days of Triamcinolone; Start to apply Tacrolimus 0 3% ointment twice a day to the face  DO NOT USE both at the same time  This is not a steroid and it is okay to use daily   For the HANDS and FEET; Start to use Clobetasol 0 1% ointment  Apply twice a day to both those areas for 14 days and than give your skin about a 7 day break before re-applying treatment again  This gives your skin a break to breathe from the steroid and less chances of your skin from thinning out   To consider biopsy, patch testing  Assessment and Plan:   Atopic Dermatitis is a chronic, itchy skin condition that is very common in children but may occur at any age  It is also known as eczema or atopic eczema   It is the most common form of dermatitis  Atopic dermatitis usually occurs in people who have an atopic tendency    This means they may develop any or all of these closely linked conditions: Atopic dermatitis, asthma, hay fever (allergic rhinitis), eosinophilic esophagitis, and gastroenteritis  Often these conditions run within families with a parent, child or sibling also affected  A family history of asthma, eczema or hay fever is particularly useful in diagnosing atopic dermatitis in infants  Atopic dermatitis arises because of a complex interaction of genetic and environmental factors  These include defects in skin barrier function making the skin more susceptible to irritation by soap and other contact irritants, the weather, temperature and non-specific triggers  There is also an element of immune system dysregulation that is often present  By definition, it is chronic and has a "waxing-waning" nature; flares should be expected but with good education and treatment strategies can be minimized  Some specific tips we discussed:   Dry skin care   Using only mild cleansers (hypoallergenic and without fragrances) and fragrance free detergent (not unscented products which contain a masking agent); we discussed avoiding irritants/fragranced products   The importance of regular application of moisturizers daily (at least 3 times a day)   The known and theoretical side effects of steroids at length, including but not limited to atrophy of skin and increased pressure in eye (glaucoma) and clouding of the eye's lens (cataracts) if used in or around the eye for extended durations   The specific over-the-counter interventions and medications   Side effects, risks and benefits of topical and oral medications discussed   After lengthy discussion of etiology and treatment options, we decided to implement the following personalized treatment plan:      EDUCATION AS INTERVENTION! WHAT IS ATOPIC DERMATITIS? Atopic dermatitis (also called eczema) is a condition of the skin where the skin is dry, red, and itchy  The main function of the skin is to provide a barrier from the environment and is also the first defense of the immune system  In atopic dermatitis the skin barrier is decreased or disrupted, and the skin is easily irritated    As a result, moisture escapes the skin more easily, and environmental allergens and microbes can enter the skin more easily  Consequently, the skin's immune system is altered  If there are increased allergic type cells in the skin, the skin may become red and hyper-excitable   This leads to itching and a subsequent rash  WHY DO PEOPLE GET ATOPIC DERMATITIS? There is no single answer because many factors are involved  It is likely a combination of genetic makeup and environmental triggers and/or exposures  Excessive drying or sweating of the skin, Irritating soaps, dust mites, and pet dander are some of the more common triggers  There is no blood test that can be done to confirm this diagnosis  The history and appearance of the skin is usually sufficient for a diagnosis  However, in some cases if the rash does not fit with the history or respond appropriately to treatment, a skin biopsy may be helpful  Many children do outgrow atopic dermatitis or get better; however, many continue to have sensitive skin into adulthood  Asthma and hay fever are often seen in many patients with atopic dermatitis; however, asthma flares do not necessarily occur at the same time as skin flares  PREVENTING FLARES OF ATOPIC DERMATITIS  The first step is to maintain the skin's barrier function  Keep the skin well moisturized  Avoid irritants and triggers  Use prescribed medicine when there are red or rough areas to help the skin to return to normal as quickly as possible  Try to limit scratching  If you keep the skin well moisturized, and avoid coming in contact with things you know irritate your child's skin, there will be less flares  However, some flares of atopic dermatitis are beyond your control  You should work with your health care provider to come up with a plan that minimizes flares while minimizing long term use of medications that suppress the immune system  WHAT ARE SOME OF THE TRIGGERS? Triggers are different for different people   The most common triggers are:   Heat and sweat for some individuals, cold weather for others   House dust mites, pet fur   Wool; synthetic fabrics like nylon; dyed fabrics   Tobacco smoke    Fragrances in: shampoos, soaps, lotions, laundry detergents, fabric softeners   Saliva or prolonged exposure to water  WHAT ABOUT FOOD ALLERGIES? This is a very controversial topic, as many believe that food allergies are responsible for skin flares  In some cases, specific foods may cause worsening of atopic dermatitis; however this occurs in a minority of cases and usually happens within a few hours of ingestion  While food allergy is more common in children with eczema, foods are specific triggers for flares in only a small percentage of children  If you notice that the skin flares after certain foods you can see if eliminating one food at time makes a difference, as long as your child can still enjoy a well-balanced diet  There are blood (RAST) and skin (PRICK) tests that can check for allergies, but they are often positive in children who are not truly allergic  Therefore it is important that you work with your allergist and dermatologist to determine which foods are relevant and causing true symptoms  Extreme food elimination diets without the guidance of your doctor, which have become more popular in recent years, may even result in worsening of the skin rash due to malnutrition and avoidance of essential nutrients  TREATMENT  Treatments are aimed at minimizing exposure to irritating factors and decreasing  the skin inflammation which results in an itchy rash  There are many different treatment options, which depend on your child's rash, its location, and severity  Topical treatments include corticosteroids and steroid-like creams such as Protopic, Elidel, and Eucrisa, which are believed to not thin the skin    Please read the discussions below regarding risks and benefits of all of these creams  Occasionally bacterial or viral infections can occur which flare the skin and require oral and/or topical antibiotics or antivirals  In some cases bleach baths 2-3 times weekly can be helpful to prevent recurrent infection  For severe disease, strong oral medications such as corticosteroids, methotrexate or azathioprine (Imuran) may be needed  These medications require close monitoring and follow-up  You should discuss the risks/ benefits/alternatives of these medications with your health care provider to come up with the best treatment plan for your child  1) Use moisturizer all over the entire body at least THREE TIMES a day  This keeps the skin moisturized to restore the barrier function  Find a cream or ointment that your child likes - this is the most important  The medicines do not work in the bottle  The thicker the moisturizer, generally the better barrier it provides  Ointments often moisturize better than creams; and creams work better than lotions  Lotions are more useful during the summer when thick greasy ointments are uncomfortable  If you put moisturizer on the skin after bathing, while the skin is damp, it is twice as effective  The moisturizer provides a seal holding the water in the skin  You may bathe your child in warm - not hot - water, for short periods of time (no more than 5-10 minutes at a time) once a day if they like  Lightly pat your child dry with a towel and, while the skin is still damp, (within 3 minutes) apply a moisturizer from head to toe  If your child is using a medicated cream, apply it and allow it to absorb completely BEFORE you apply the moisturizer  2) Apply the prescription medication TWICE A DAY to only the red, rough areas on the skin OR AS [place]  Put the medication on your fingers and gently rub it into the areas  Usually the medicine will help an area within a few days time    Try to put the medicine on for two days after you have noticed that the redness is no longer present; this will help the redness from returning  The severity of the rash and the strength and usage of the medication will determine how quickly you see improvement  It is important that you do not overuse steroid creams, and if you notice a thin, shiny appearance to the skin or broken blood vessels, you should stop using the cream and consult your health care provider regarding possible overuse/overthinning of the skin  The face, armpits and groin have particularly thin and sensitive skin and are therefore most at risk for bad results if steroids are over-used in these sites  3) Avoid triggers  Some children have specific things that trigger itching and rashes, while others may have none that can be identified  It may require a little bit of trial and error to see what applies to your child  Also, triggers can change over time for your child  The most common triggers are listed above; start with these  Avoid the use of fabric softeners in the washing machine or dryer sheets (unless they are fragrance-free)  Try to use laundry detergents, soaps and shampoos that are fragrance-free  You may find it helpful to double-rinse your clothes  Some children are sensitive to house dust mites and they may benefit from a plastic mattress wrap  While food allergy is more common in children with eczema, foods are specific triggers for flares in only a small percentage of children  If you notice that the skin flares after certain foods you can see if eliminating one food at time makes a difference, as long as your child can still enjoy a well-balanced diet  4) Consider using a medication like an anti-histamine by mouth to help control the itching  Scratching only makes the skin more reactive and the barrier function even more disrupted  It can cause both children and their parents to lose sleep!   There are different types of anti-itch medications  Some cause more drowsiness than others  Both types are acceptable depending on your child and your preference  Start with Benadryl and if that does not work, ask for a prescription antihistamine      5) About the prescription creams:  Corticosteroid creams and ointments (generally things with "-one" or "imer" on the end of their names): The strength of the cream or ointment depends on the name of the active ingredient  The numbers at the end do not indicate the relative strength  Thus triamcinolone 0 1% ointment, considered a mid-strength corticosteroid, is much stronger than hydrocortisone 1% even though the number following the name is much lower  Topical corticosteroids are very effective in treating atopic dermatitis  When used in the manner prescribed (to rashy areas of skin and for no more than a few weeks at a time to any one area) they are very safe  These are corticosteroids and are anti-inflammatory, not the anabolic steroids like those used illegally by some athletes  Topical non-steroid creams and ointments (immunomodulators): These creams and ointments are also called topical calcineurin inhibitors (TCIs)  These include Protopic ointment and Elidel cream  Crisaborole 2% Tamea Poor) is a prescription ointment that targets an enzyme called PDE4 (phosphodiesterase 4)  It is used on the skin topically to treat mild-to-moderate eczema in adults and children 3years of age and older  In total, these nonsteroidal prescriptions are used to help decrease itching and redness in the skin  They are not as strong as most steroid creams; however, it is believed that they do not thin the skin when overused  They are generally used as second-line medications, though they may be used alone or in conjunction with topical steroids  In sensitive areas such as the face, underarms or groin, they are often recommended    They can sting inflamed skin, but are generally well tolerated once the skin is healing  The FDA placed a black-box warning on both Elidel and Protopic in 2006 based on animal studies using the medications  Some animals developed skin cancer and lymphoma  Subsequently, the FDA released a statement that there is no causal relationship between the two medications and cancer  Because of this concern, there are ongoing studies to evaluate this relationship in humans  So far, there are studies that support the safety of these medications  One showed that the rates of cancer in patient using these medications topically were less than the rates of the general population and another showed that in patient's using the medication over a large area of the body, the levels of the medication in the blood was undetectable  As for Eucrisa, this product is only approved for the topical treatment of mild-to-moderate eczema in patients 3years of age and older; use of the medication in kids younger than 2 is considered off label and has not been formally studied  Burning and stinging are the most commonly reported side effects of this medication  Rarely, this product has been known to cause hives and hypersensitivity reactions; discontinue its use if you develop severe itching, swelling, or redness in the area of application        Scribe Attestation    I,:  Adia Amador am acting as a scribe while in the presence of the attending physician :       I,:  Qing Nicole MD personally performed the services described in this documentation    as scribed in my presence :       Huy Pugh MD  Dermatology PGY-2 Resident

## 2022-10-06 NOTE — PATIENT INSTRUCTIONS
ATOPIC DERMATITIS ("childhood Eczema")    Assessment and Plan:  Based on a thorough discussion of this condition and the management approach to it (including a comprehensive discussion of the known risks, side effects and potential benefits of treatment), the patient (family) agrees to implement the following specific plan:    Discussed STOP the use of Amlactin lotion, Goldbond, salicylic acid shampoo  Continue using the Cerave moisturizing cream, and Cerave wash that was used for showering  Discussed treatment options such as; Phototherapy, oral biologics, injectable biologics and topical steroids    - Discussed starting the PA for Dupixent  Patient was informed that this can take at least 30 days to get approved  Start to use Triamcinolone 0 1% ointment  Apply up to four times a day to all affected areas on the ARMS, LEGS, NECK, ABDOMEN for only 2 weeks at a time  For the face  Apply twice a day for ONLY 7 DAYS and than STOP  This is a steroid topical and can thin your skin out if you DO NOT take a break in between using after the 14 days  For the face AFTER 7 days of Triamcinolone; Start to apply Tacrolimus 0 3% ointment twice a day to the face  DO NOT USE both at the same time  This is not a steroid and it is okay to use daily  For the HANDS and FEET; Start to use Clobetasol 0 1% ointment  Apply twice a day to both those areas for 14 days and than give your skin about a 7 day break before re-applying treatment again  This gives your skin a break to breathe from the steroid and less chances of your skin from thinning out  Assessment and Plan:   Atopic Dermatitis is a chronic, itchy skin condition that is very common in children but may occur at any age  It is also known as eczema or atopic eczema   It is the most common form of dermatitis  Atopic dermatitis usually occurs in people who have an atopic tendency    This means they may develop any or all of these closely linked conditions:   Atopic dermatitis, asthma, hay fever (allergic rhinitis), eosinophilic esophagitis, and gastroenteritis  Often these conditions run within families with a parent, child or sibling also affected  A family history of asthma, eczema or hay fever is particularly useful in diagnosing atopic dermatitis in infants  Atopic dermatitis arises because of a complex interaction of genetic and environmental factors  These include defects in skin barrier function making the skin more susceptible to irritation by soap and other contact irritants, the weather, temperature and non-specific triggers  There is also an element of immune system dysregulation that is often present  By definition, it is chronic and has a "waxing-waning" nature; flares should be expected but with good education and treatment strategies can be minimized  Some specific tips we discussed:  Dry skin care  Using only mild cleansers (hypoallergenic and without fragrances) and fragrance free detergent (not unscented products which contain a masking agent); we discussed avoiding irritants/fragranced products  The importance of regular application of moisturizers daily (at least 3 times a day)  The known and theoretical side effects of steroids at length, including but not limited to atrophy of skin and increased pressure in eye (glaucoma) and clouding of the eye's lens (cataracts) if used in or around the eye for extended durations  The specific over-the-counter interventions and medications  Side effects, risks and benefits of topical and oral medications discussed  After lengthy discussion of etiology and treatment options, we decided to implement the following personalized treatment plan:      EDUCATION AS INTERVENTION! WHAT IS ATOPIC DERMATITIS? Atopic dermatitis (also called eczema) is a condition of the skin where the skin is dry, red, and itchy    The main function of the skin is to provide a barrier from the environment and is also the first defense of the immune system  In atopic dermatitis the skin barrier is decreased or disrupted, and the skin is easily irritated  As a result, moisture escapes the skin more easily, and environmental allergens and microbes can enter the skin more easily  Consequently, the skin's immune system is altered  If there are increased allergic type cells in the skin, the skin may become red and hyper-excitable   This leads to itching and a subsequent rash  WHY DO PEOPLE GET ATOPIC DERMATITIS? There is no single answer because many factors are involved  It is likely a combination of genetic makeup and environmental triggers and/or exposures  Excessive drying or sweating of the skin, Irritating soaps, dust mites, and pet dander are some of the more common triggers  There is no blood test that can be done to confirm this diagnosis  The history and appearance of the skin is usually sufficient for a diagnosis  However, in some cases if the rash does not fit with the history or respond appropriately to treatment, a skin biopsy may be helpful  Many children do outgrow atopic dermatitis or get better; however, many continue to have sensitive skin into adulthood  Asthma and hay fever are often seen in many patients with atopic dermatitis; however, asthma flares do not necessarily occur at the same time as skin flares  PREVENTING FLARES OF ATOPIC DERMATITIS  The first step is to maintain the skin's barrier function  Keep the skin well moisturized  Avoid irritants and triggers  Use prescribed medicine when there are red or rough areas to help the skin to return to normal as quickly as possible  Try to limit scratching  If you keep the skin well moisturized, and avoid coming in contact with things you know irritate your child's skin, there will be less flares  However, some flares of atopic dermatitis are beyond your control    You should work with your health care provider to come up with a plan that minimizes flares while minimizing long term use of medications that suppress the immune system  WHAT ARE SOME OF THE TRIGGERS? Triggers are different for different people  The most common triggers are:  Heat and sweat for some individuals, cold weather for others  House dust mites, pet fur  Wool; synthetic fabrics like nylon; dyed fabrics  Tobacco smoke   Fragrances in: shampoos, soaps, lotions, laundry detergents, fabric softeners  Saliva or prolonged exposure to water  WHAT ABOUT FOOD ALLERGIES? This is a very controversial topic, as many believe that food allergies are responsible for skin flares  In some cases, specific foods may cause worsening of atopic dermatitis; however this occurs in a minority of cases and usually happens within a few hours of ingestion  While food allergy is more common in children with eczema, foods are specific triggers for flares in only a small percentage of children  If you notice that the skin flares after certain foods you can see if eliminating one food at time makes a difference, as long as your child can still enjoy a well-balanced diet  There are blood (RAST) and skin (PRICK) tests that can check for allergies, but they are often positive in children who are not truly allergic  Therefore it is important that you work with your allergist and dermatologist to determine which foods are relevant and causing true symptoms  Extreme food elimination diets without the guidance of your doctor, which have become more popular in recent years, may even result in worsening of the skin rash due to malnutrition and avoidance of essential nutrients  TREATMENT  Treatments are aimed at minimizing exposure to irritating factors and decreasing  the skin inflammation which results in an itchy rash  There are many different treatment options, which depend on your child's rash, its location, and severity    Topical treatments include corticosteroids and steroid-like creams such as Protopic, Elidel, and Eucrisa, which are believed to not thin the skin  Please read the discussions below regarding risks and benefits of all of these creams  Occasionally bacterial or viral infections can occur which flare the skin and require oral and/or topical antibiotics or antivirals  In some cases bleach baths 2-3 times weekly can be helpful to prevent recurrent infection  For severe disease, strong oral medications such as corticosteroids, methotrexate or azathioprine (Imuran) may be needed  These medications require close monitoring and follow-up  You should discuss the risks/ benefits/alternatives of these medications with your health care provider to come up with the best treatment plan for your child  1) Use moisturizer all over the entire body at least THREE TIMES a day  This keeps the skin moisturized to restore the barrier function  Find a cream or ointment that your child likes - this is the most important  The medicines do not work in the bottle  The thicker the moisturizer, generally the better barrier it provides  Ointments often moisturize better than creams; and creams work better than lotions  Lotions are more useful during the summer when thick greasy ointments are uncomfortable  If you put moisturizer on the skin after bathing, while the skin is damp, it is twice as effective  The moisturizer provides a seal holding the water in the skin  You may bathe your child in warm - not hot - water, for short periods of time (no more than 5-10 minutes at a time) once a day if they like  Lightly pat your child dry with a towel and, while the skin is still damp, (within 3 minutes) apply a moisturizer from head to toe  If your child is using a medicated cream, apply it and allow it to absorb completely BEFORE you apply the moisturizer      2) Apply the prescription medication TWICE A DAY to only the red, rough areas on the skin OR AS [place]  Put the medication on your fingers and gently rub it into the areas  Usually the medicine will help an area within a few days time  Try to put the medicine on for two days after you have noticed that the redness is no longer present; this will help the redness from returning  The severity of the rash and the strength and usage of the medication will determine how quickly you see improvement  It is important that you do not overuse steroid creams, and if you notice a thin, shiny appearance to the skin or broken blood vessels, you should stop using the cream and consult your health care provider regarding possible overuse/overthinning of the skin  The face, armpits and groin have particularly thin and sensitive skin and are therefore most at risk for bad results if steroids are over-used in these sites  3) Avoid triggers  Some children have specific things that trigger itching and rashes, while others may have none that can be identified  It may require a little bit of trial and error to see what applies to your child  Also, triggers can change over time for your child  The most common triggers are listed above; start with these  Avoid the use of fabric softeners in the washing machine or dryer sheets (unless they are fragrance-free)  Try to use laundry detergents, soaps and shampoos that are fragrance-free  You may find it helpful to double-rinse your clothes  Some children are sensitive to house dust mites and they may benefit from a plastic mattress wrap  While food allergy is more common in children with eczema, foods are specific triggers for flares in only a small percentage of children  If you notice that the skin flares after certain foods you can see if eliminating one food at time makes a difference, as long as your child can still enjoy a well-balanced diet  4) Consider using a medication like an anti-histamine by mouth to help control the itching      Scratching only makes the skin more reactive and the barrier function even more disrupted  It can cause both children and their parents to lose sleep! There are different types of anti-itch medications  Some cause more drowsiness than others  Both types are acceptable depending on your child and your preference  Start with Benadryl and if that does not work, ask for a prescription antihistamine      5) About the prescription creams:  Corticosteroid creams and ointments (generally things with "-one" or "imer" on the end of their names): The strength of the cream or ointment depends on the name of the active ingredient  The numbers at the end do not indicate the relative strength  Thus triamcinolone 0 1% ointment, considered a mid-strength corticosteroid, is much stronger than hydrocortisone 1% even though the number following the name is much lower  Topical corticosteroids are very effective in treating atopic dermatitis  When used in the manner prescribed (to rashy areas of skin and for no more than a few weeks at a time to any one area) they are very safe  These are corticosteroids and are anti-inflammatory, not the anabolic steroids like those used illegally by some athletes  Topical non-steroid creams and ointments (immunomodulators): These creams and ointments are also called topical calcineurin inhibitors (TCIs)  These include Protopic ointment and Elidel cream  Crisaborole 2% Antonio Gregg) is a prescription ointment that targets an enzyme called PDE4 (phosphodiesterase 4)  It is used on the skin topically to treat mild-to-moderate eczema in adults and children 3years of age and older  In total, these nonsteroidal prescriptions are used to help decrease itching and redness in the skin  They are not as strong as most steroid creams; however, it is believed that they do not thin the skin when overused  They are generally used as second-line medications, though they may be used alone or in conjunction with topical steroids    In sensitive areas such as the face, underarms or groin, they are often recommended  They can sting inflamed skin, but are generally well tolerated once the skin is healing  The FDA placed a black-box warning on both Elidel and Protopic in 2006 based on animal studies using the medications  Some animals developed skin cancer and lymphoma  Subsequently, the FDA released a statement that there is no causal relationship between the two medications and cancer  Because of this concern, there are ongoing studies to evaluate this relationship in humans  So far, there are studies that support the safety of these medications  One showed that the rates of cancer in patient using these medications topically were less than the rates of the general population and another showed that in patient's using the medication over a large area of the body, the levels of the medication in the blood was undetectable  As for Eucrisa, this product is only approved for the topical treatment of mild-to-moderate eczema in patients 3years of age and older; use of the medication in kids younger than 2 is considered off label and has not been formally studied  Burning and stinging are the most commonly reported side effects of this medication  Rarely, this product has been known to cause hives and hypersensitivity reactions; discontinue its use if you develop severe itching, swelling, or redness in the area of application

## 2022-10-11 ENCOUNTER — TELEPHONE (OUTPATIENT)
Dept: DERMATOLOGY | Facility: CLINIC | Age: 32
End: 2022-10-11

## 2022-10-11 NOTE — TELEPHONE ENCOUNTER
Prior authorization forms were filled out and faxed to patients insurance along with recent clinical documentation and demographics and marked as urgent  Forms were uploaded into patients chart

## 2022-11-10 ENCOUNTER — OFFICE VISIT (OUTPATIENT)
Dept: FAMILY MEDICINE CLINIC | Facility: CLINIC | Age: 32
End: 2022-11-10

## 2022-11-10 VITALS
BODY MASS INDEX: 20.69 KG/M2 | DIASTOLIC BLOOD PRESSURE: 96 MMHG | SYSTOLIC BLOOD PRESSURE: 150 MMHG | WEIGHT: 131.8 LBS | HEART RATE: 91 BPM | HEIGHT: 67 IN | RESPIRATION RATE: 15 BRPM | OXYGEN SATURATION: 96 % | TEMPERATURE: 97.5 F

## 2022-11-10 DIAGNOSIS — Z13.1 SCREENING FOR DIABETES MELLITUS: ICD-10-CM

## 2022-11-10 DIAGNOSIS — L30.9 ECZEMA, UNSPECIFIED TYPE: ICD-10-CM

## 2022-11-10 DIAGNOSIS — Z00.00 ANNUAL PHYSICAL EXAM: Primary | ICD-10-CM

## 2022-11-10 DIAGNOSIS — R53.82 CHRONIC FATIGUE: ICD-10-CM

## 2022-11-10 DIAGNOSIS — Z11.59 NEED FOR HEPATITIS C SCREENING TEST: ICD-10-CM

## 2022-11-10 DIAGNOSIS — Z23 NEED FOR IMMUNIZATION AGAINST INFLUENZA: ICD-10-CM

## 2022-11-10 DIAGNOSIS — F41.1 GAD (GENERALIZED ANXIETY DISORDER): ICD-10-CM

## 2022-11-10 DIAGNOSIS — Z11.4 SCREENING FOR HIV (HUMAN IMMUNODEFICIENCY VIRUS): ICD-10-CM

## 2022-11-10 DIAGNOSIS — F32.0 CURRENT MILD EPISODE OF MAJOR DEPRESSIVE DISORDER WITHOUT PRIOR EPISODE (HCC): ICD-10-CM

## 2022-11-10 DIAGNOSIS — Z13.6 SCREENING FOR CARDIOVASCULAR CONDITION: ICD-10-CM

## 2022-11-10 DIAGNOSIS — F33.1 MODERATE RECURRENT MAJOR DEPRESSION (HCC): ICD-10-CM

## 2022-11-10 DIAGNOSIS — R63.8 DIFFICULTY MAINTAINING WEIGHT: ICD-10-CM

## 2022-11-10 RX ORDER — MIRTAZAPINE 15 MG/1
15 TABLET, FILM COATED ORAL
COMMUNITY
Start: 2022-10-20

## 2022-11-10 NOTE — PATIENT INSTRUCTIONS

## 2022-11-10 NOTE — PROGRESS NOTES
[facility]    NAME: Hermelindo Padilla  AGE: 28 y o   SEX: male  : 1990     DATE: 11/10/2022     Assessment and Plan:     Problem List Items Addressed This Visit        Musculoskeletal and Integument    Eczema     Follows with dermatology             Other    LEN (generalized anxiety disorder)    Relevant Medications    mirtazapine (REMERON) 15 mg tablet    Other Relevant Orders    Lipid panel    Comprehensive metabolic panel    CBC and differential    TSH, 3rd generation with Free T4 reflex    Lyme Antibody Profile with reflex to WB    Iron    Moderate recurrent major depression ([address] )     Receives medication and therapy through [facility]          Relevant Medications    mirtazapine (REMERON) 15 mg tablet    Other Relevant Orders    Lipid panel    Comprehensive metabolic panel    CBC and differential    Human Immunodeficiency Virus 1/2 Antigen / Antibody ( Fourth Generation) with Reflex Testing    Hepatitis C antibody    Iron    Screening for cardiovascular condition    Relevant Orders    Lipid panel    Comprehensive metabolic panel    Difficulty maintaining weight    Relevant Orders    Lipid panel    Comprehensive metabolic panel    CBC and differential    TSH, 3rd generation with Free T4 reflex    Human Immunodeficiency Virus 1/2 Antigen / Antibody ( Fourth Generation) with Reflex Testing    Lyme Antibody Profile with reflex to WB    Hepatitis C antibody    Iron    Chronic fatigue    Relevant Orders    Lipid panel    Comprehensive metabolic panel    CBC and differential    TSH, 3rd generation with Free T4 reflex    Human Immunodeficiency Virus 1/2 Antigen / Antibody ( Fourth Generation) with Reflex Testing    Lyme Antibody Profile with reflex to WB    Hepatitis C antibody    Iron      Other Visit Diagnoses     Annual physical exam    -  Primary    Relevant Orders    Lipid panel    Comprehensive metabolic panel    CBC and differential    TSH, 3rd generation with Free T4 reflex    Human Immunodeficiency Virus 1/2 Antigen / Antibody ( Fourth Generation) with Reflex Testing    Lyme Antibody Profile with reflex to WB    Hepatitis C antibody    Iron    Current mild episode of major depressive disorder without prior episode (HCC)        Relevant Medications    mirtazapine (REMERON) 15 mg tablet    Other Relevant Orders    Lipid panel    Comprehensive metabolic panel    CBC and differential    TSH, 3rd generation with Free T4 reflex    Lyme Antibody Profile with reflex to WB    Iron    Screening for diabetes mellitus        Relevant Orders    Lipid panel    Comprehensive metabolic panel    Screening for HIV (human immunodeficiency virus)        Relevant Orders    Human Immunodeficiency Virus 1/2 Antigen / Antibody ( Fourth Generation) with Reflex Testing    Need for hepatitis C screening test        Relevant Orders    Hepatitis C antibody    Need for immunization against influenza        Relevant Orders    influenza vaccine, quadrivalent, 0 5 mL, preservative-free, for adult and pediatric patients 6 mos+ (AFLURIA, FLUARIX, FLULAVAL, FLUZONE) (Completed)          Immunizations and preventive care screenings were discussed with patient today  Appropriate education was printed on patient's after visit summary  Counseling:  Alcohol/drug use: discussed moderation in alcohol intake, the recommendations for healthy alcohol use, and avoidance of illicit drug use  Dental Health: discussed importance of regular tooth brushing, flossing, and dental visits  Injury prevention: discussed safety/seat belts, safety helmets, smoke detectors, carbon dioxide detectors, and smoking near bedding or upholstery  Sexual health: discussed sexually transmitted diseases, partner selection, use of condoms, avoidance of unintended pregnancy, and contraceptive alternatives  · Exercise: the importance of regular exercise/physical activity was discussed  Recommend exercise 3-5 times per week for at least 30 minutes  Return in about 1 year (around 11/10/2023) for Annual physical      Chief Complaint:     Chief Complaint   Patient presents with   • Annual Exam     Would like thyroid checked       History of Present Illness:     Adult Annual Physical   Patient here for a comprehensive physical exam      Diet and Physical Activity  · Diet/Nutrition: well balanced diet  · Exercise: moderate cardiovascular exercise  Depression Screening  PHQ-2/9 Depression Screening    Little interest or pleasure in doing things: 0 - not at all  Feeling down, depressed, or hopeless: 2 - more than half the days  Trouble falling or staying asleep, or sleeping too much: 0 - not at all  Feeling tired or having little energy: 0 - not at all  Poor appetite or overeatin - not at all  Feeling bad about yourself - or that you are a failure or have let yourself or your family down: 0 - not at all  Trouble concentrating on things, such as reading the newspaper or watching television: 0 - not at all  Moving or speaking so slowly that other people could have noticed  Or the opposite - being so fidgety or restless that you have been moving around a lot more than usual: 0 - not at all  Thoughts that you would be better off dead, or of hurting yourself in some way: 0 - not at all  PHQ-9 Score: 2   PHQ-9 Interpretation: No or Minimal depression           Review of Systems:     Review of Systems   Constitutional: Positive for fatigue  Negative for chills and fever  HENT: Negative for congestion, postnasal drip, rhinorrhea and sinus pain  Eyes: Negative for photophobia and visual disturbance  Respiratory: Negative for cough and shortness of breath  Cardiovascular: Negative for chest pain, palpitations and leg swelling  Gastrointestinal: Negative for abdominal pain, constipation, diarrhea, nausea and vomiting  Genitourinary: Negative for difficulty urinating and dysuria  Musculoskeletal: Negative for arthralgias and myalgias  Skin: Positive for rash  Neurological: Negative for dizziness and syncope  Past Medical History:     Past Medical History:   Diagnosis Date   • Anxiety    • Chicken pox    • Depression    • Eczema       Past Surgical History:     History reviewed  No pertinent surgical history  Social History:     Social History     Socioeconomic History   • Marital status: Single     Spouse name: None   • Number of children: None   • Years of education: None   • Highest education level: None   Occupational History   • None   Tobacco Use   • Smoking status: Current Every Day Smoker     Packs/day: 1 00   • Smokeless tobacco: Never Used   • Tobacco comment: Smoker/tobacco use - As per [employer]    Vaping Use   • Vaping Use: Every day   • Substances: Nicotine   Substance and Sexual Activity   • Alcohol use: Not Currently     Comment: 2 years sober   • Drug use: Not Currently     Types: Marijuana   • Sexual activity: Yes     Partners: Female     Birth control/protection: Condom Male   Other Topics Concern   • None   Social History Narrative   • None     Social Determinants of Health     Financial Resource Strain: Not on file   Food Insecurity: Not on file   Transportation Needs: Not on file   Physical Activity: Not on file   Stress: Not on file   Social Connections: Not on file   Intimate Partner Violence: Not on file   Housing Stability: Not on file      Family History:     History reviewed  No pertinent family history  Current Medications:     Current Outpatient Medications   Medication Sig Dispense Refill   • clobetasol (TEMOVATE) 0 05 % ointment Apply twice a day to affected areas on hands and feet   60 g 3   • desoximetasone (TOPICORT) 0 25 % cream Apply topically 2 (two) times a day 30 g 0   • DULoxetine (CYMBALTA) 60 mg delayed release capsule TAKE 1 CAPSULE BY MOUTH ONCE DAILY WITH FOOD IN THE MORNING     • gabapentin (NEURONTIN) 400 mg capsule Take 400 mg by mouth 3 (three) times a day     • hydrOXYzine HCL (ATARAX) 50 mg tablet      • mirtazapine (REMERON) 15 mg tablet Take 15 mg by mouth daily at bedtime     • prazosin (MINIPRESS) 1 mg capsule Take 1 mg by mouth daily at bedtime     • tacrolimus (PROTOPIC) 0 03 % ointment Apply twice a day to the face  100 g 3   • traZODone (DESYREL) 100 mg tablet Take 100 mg by mouth daily at bedtime     • ondansetron (ZOFRAN) 4 mg tablet Take 1 tablet (4 mg total) by mouth every 6 (six) hours as needed for nausea or vomiting (Patient not taking: Reported on 11/10/2022) 20 tablet 0   • sertraline (ZOLOFT) 50 mg tablet  (Patient not taking: Reported on 11/10/2022)       No current facility-administered medications for this visit  Allergies:     No Known Allergies   Physical Exam:     /96 (BP Location: Right arm, Patient Position: Sitting, Cuff Size: Standard)   Pulse 91   Temp 97 5 °F (36 4 °C) (Tympanic)   Resp 15   Ht 5' 6 7" (1 694 m)   Wt 59 8 kg (131 lb 12 8 oz)   SpO2 96%   BMI 20 83 kg/m²     Physical Exam  Constitutional:       General: He is not in acute distress  Appearance: Normal appearance  He is not ill-appearing, toxic-appearing or diaphoretic  HENT:      Head: Normocephalic and atraumatic  Right Ear: Tympanic membrane and ear canal normal       Left Ear: Tympanic membrane and ear canal normal       Nose: Nose normal  No congestion  Mouth/Throat:      Mouth: Mucous membranes are moist       Pharynx: Oropharynx is clear  No oropharyngeal exudate  Eyes:      Extraocular Movements: Extraocular movements intact  Conjunctiva/sclera: Conjunctivae normal       Pupils: Pupils are equal, round, and reactive to light  Cardiovascular:      Rate and Rhythm: Normal rate and regular rhythm  Pulses: Normal pulses  Heart sounds: No murmur heard  Pulmonary:      Effort: Pulmonary effort is normal       Breath sounds: Normal breath sounds  No wheezing, rhonchi or rales     Abdominal: General: Bowel sounds are normal  There is no distension  Palpations: Abdomen is soft  Tenderness: There is no abdominal tenderness  Musculoskeletal:         General: No swelling or tenderness  Normal range of motion  Cervical back: Normal range of motion and neck supple  Skin:     General: Skin is warm and dry  Capillary Refill: Capillary refill takes less than 2 seconds  Neurological:      General: No focal deficit present  Mental Status: He is alert and oriented to person, place, and time  Cranial Nerves: No cranial nerve deficit  Psychiatric:         Mood and Affect: Mood normal          Behavior: Behavior normal          Thought Content:  Thought content normal           Marisol Chung, DO   [address]

## 2023-01-09 ENCOUNTER — OFFICE VISIT (OUTPATIENT)
Dept: DERMATOLOGY | Facility: CLINIC | Age: 33
End: 2023-01-09

## 2023-01-09 VITALS — HEIGHT: 66 IN | WEIGHT: 135.5 LBS | TEMPERATURE: 98.8 F | BODY MASS INDEX: 21.78 KG/M2

## 2023-01-09 DIAGNOSIS — L30.8 OTHER ECZEMA: Primary | ICD-10-CM

## 2023-01-09 DIAGNOSIS — L20.9 ATOPIC DERMATITIS, UNSPECIFIED TYPE: ICD-10-CM

## 2023-01-09 NOTE — PROGRESS NOTES
[place] [address] [facility] Note     Patient Name: Bernard Jack  Encounter Date: 01/09/2023     Have you been cared for by a [place] [address] Dermatologist in the last 3 years and, if so, which description applies to you? Yes  I have been here within the last 3 years, and my medical history has NOT changed since that time  I am MALE/not capable of bearing children  REVIEW OF SYSTEMS:  Have you recently had or currently have any of the following? · No changes in my recent health  PAST MEDICAL HISTORY:  Have you personally ever had or currently have any of the following? If "YES," then please provide more detail  · No changes in my medical history  FAMILY HISTORY:  Any "first degree relatives" (parent, brother, sister, or child) with the following? • No changes in my family's known health  PATIENT EXPERIENCE:    • Do you want the Dermatologist to perform a COMPLETE skin exam today including a clinical examination under the "bra and underwear" areas? NO  • If necessary, do we have your permission to call and leave a detailed message on your Preferred Phone number that includes your specific medical information?   Yes      No Known Allergies   Current Outpatient Medications:   •  clobetasol (TEMOVATE) 0 05 % ointment, Apply twice a day to affected areas on hands and feet , Disp: 60 g, Rfl: 3  •  desoximetasone (TOPICORT) 0 25 % cream, Apply topically 2 (two) times a day, Disp: 30 g, Rfl: 0  •  DULoxetine (CYMBALTA) 60 mg delayed release capsule, TAKE 1 CAPSULE BY MOUTH ONCE DAILY WITH FOOD IN THE MORNING, Disp: , Rfl:   •  gabapentin (NEURONTIN) 400 mg capsule, Take 400 mg by mouth 3 (three) times a day, Disp: , Rfl:   •  hydrOXYzine HCL (ATARAX) 50 mg tablet, , Disp: , Rfl:   •  mirtazapine (REMERON) 15 mg tablet, Take 15 mg by mouth daily at bedtime, Disp: , Rfl:   •  prazosin (MINIPRESS) 1 mg capsule, Take 1 mg by mouth daily at bedtime, Disp: , Rfl:   •  tacrolimus (PROTOPIC) 0 03 % ointment, Apply twice a day to the face , Disp: 100 g, Rfl: 3  •  traZODone (DESYREL) 100 mg tablet, Take 100 mg by mouth daily at bedtime, Disp: , Rfl:   •  ondansetron (ZOFRAN) 4 mg tablet, Take 1 tablet (4 mg total) by mouth every 6 (six) hours as needed for nausea or vomiting (Patient not taking: Reported on 11/10/2022), Disp: 20 tablet, Rfl: 0  •  sertraline (ZOLOFT) 50 mg tablet, , Disp: , Rfl:           • Whom besides the patient is providing clinical information about today's encounter?   o NO ADDITIONAL HISTORIAN (patient alone provided history)    Physical Exam and Assessment/Plan by Diagnosis:      1  ATOPIC DERMATITIS ("childhood Eczema")     Physical Exam:  • Anatomic Location Affected:  Hands  • Morphological Description:  Erythematous patches with scale  • Body Surface Area Today:  2%  • Overall Severity: mild                  Additional History of Present Condition:  Patient stated he was diagnosed about 9 years ago with eczema on the hands  Patient stated in June 2022 eczema has flared worse and has spread to all parts of the body, including; feet, legs, neck, face, ears, abdomen  Patient complains of itching, bleeding, burning, irritation, redness  Patient has tried and failed over the counter eczema creams, Triamcinolone 0 25% cream, and Desoximetasone 0 25% cream  Patient stated his eczema flares in weather, warm/hot showers, sweating, and friction  Patient is currently using clobetasol ointment, topicort cream and tacrolimus ointment  Patient reports he is doing better, but still has some active areas on both hands      Assessment and Plan:  Based on a thorough discussion of this condition and the management approach to it (including a comprehensive discussion of the known risks, side effects and potential benefits of treatment), the patient (family) agrees to implement the following specific plan:     • Please start betamethasone valerate 0 1% ointment  Apply topically to hands 1-2 times daily    • Please let us know if no improvement in hands  Can send photographs in my chart message  • Can consider patch testing in the future if needed  • Discussed Dupixent as a possible treatment option in the future  • Follow up as needed  • New, less stressful job seems to be helping              Scribe Attestation    I,:  Luma Caballero am acting as a scribe while in the presence of the attending physician :       I,:  Sammie Singh MD personally performed the services described in this documentation    as scribed in my presence :

## 2023-01-09 NOTE — PATIENT INSTRUCTIONS
1  ATOPIC DERMATITIS ("childhood Eczema")     Assessment and Plan:  Based on a thorough discussion of this condition and the management approach to it (including a comprehensive discussion of the known risks, side effects and potential benefits of treatment), the patient (family) agrees to implement the following specific plan:     Please start betamethasone valerate 0 1% ointment  Apply topically to hands 1-2 times daily  Please let us know if no improvement in hands  Can send photographs in my chart message  Can consider patch testing in the future if needed  Discussed dupixent as a possible treatment option in the future    Follow up as needed
2020

## 2023-01-10 ENCOUNTER — TELEPHONE (OUTPATIENT)
Dept: DERMATOLOGY | Facility: CLINIC | Age: 33
End: 2023-01-10

## 2023-01-10 NOTE — TELEPHONE ENCOUNTER
Called Pt and asked if he was able to obtain a referral for his visit with us yesterday in [facility]  He stated he will be contacting them and make sure that we get the Referral as soon as possible  I did try a few times to reach out to the Dr for the pt, however, the hold time yesterday was 27 minutes and I was unable to hold as I was at check in  I tried today and unfortunately when I got someone on the line, I had to place them on hold  ( at check in ) and although they stated they could hold when I went back they were not there    Pt is aware that he will receive a bill if we do not receive a referral

## 2023-02-01 DIAGNOSIS — F41.1 GAD (GENERALIZED ANXIETY DISORDER): Primary | ICD-10-CM

## 2023-02-01 DIAGNOSIS — F33.1 MODERATE RECURRENT MAJOR DEPRESSION (HCC): ICD-10-CM

## 2023-02-02 RX ORDER — TRAZODONE HYDROCHLORIDE 150 MG/1
150 TABLET ORAL
Qty: 30 TABLET | Refills: 0 | Status: SHIPPED | OUTPATIENT
Start: 2023-02-02

## 2023-02-02 RX ORDER — GABAPENTIN 400 MG/1
400 CAPSULE ORAL 3 TIMES DAILY
Qty: 90 CAPSULE | Refills: 0 | Status: SHIPPED | OUTPATIENT
Start: 2023-02-02

## 2023-02-02 RX ORDER — HYDROXYZINE 50 MG/1
50 TABLET, FILM COATED ORAL 2 TIMES DAILY PRN
Qty: 60 TABLET | Refills: 0 | Status: SHIPPED | OUTPATIENT
Start: 2023-02-02

## 2023-02-02 RX ORDER — PRAZOSIN HYDROCHLORIDE 1 MG/1
1 CAPSULE ORAL
Qty: 30 CAPSULE | Refills: 0 | Status: SHIPPED | OUTPATIENT
Start: 2023-02-02

## 2023-02-02 RX ORDER — MIRTAZAPINE 15 MG/1
15 TABLET, FILM COATED ORAL
Qty: 30 TABLET | Refills: 0 | Status: SHIPPED | OUTPATIENT
Start: 2023-02-02

## 2023-02-02 RX ORDER — DULOXETIN HYDROCHLORIDE 60 MG/1
60 CAPSULE, DELAYED RELEASE ORAL DAILY
Qty: 30 CAPSULE | Refills: 0 | Status: SHIPPED | OUTPATIENT
Start: 2023-02-02

## 2023-02-17 ENCOUNTER — TELEMEDICINE (OUTPATIENT)
Dept: PSYCHIATRY | Facility: CLINIC | Age: 33
End: 2023-02-17

## 2023-02-17 DIAGNOSIS — F33.1 MAJOR DEPRESSIVE DISORDER, RECURRENT, MODERATE (HCC): Primary | ICD-10-CM

## 2023-02-17 DIAGNOSIS — F43.10 POST TRAUMATIC STRESS DISORDER (PTSD): ICD-10-CM

## 2023-02-17 DIAGNOSIS — F33.1 MODERATE RECURRENT MAJOR DEPRESSION (HCC): ICD-10-CM

## 2023-02-17 DIAGNOSIS — F10.21 ALCOHOL USE DISORDER, MODERATE, IN SUSTAINED REMISSION (HCC): ICD-10-CM

## 2023-02-17 DIAGNOSIS — G47.00 INSOMNIA, UNSPECIFIED TYPE: ICD-10-CM

## 2023-02-17 DIAGNOSIS — F41.1 GAD (GENERALIZED ANXIETY DISORDER): ICD-10-CM

## 2023-02-17 DIAGNOSIS — F41.1 GENERALIZED ANXIETY DISORDER: ICD-10-CM

## 2023-02-17 RX ORDER — MIRTAZAPINE 15 MG/1
7.5 TABLET, FILM COATED ORAL
Qty: 30 TABLET | Refills: 2 | Status: SHIPPED | OUTPATIENT
Start: 2023-02-17

## 2023-02-17 RX ORDER — HYDROXYZINE 50 MG/1
50 TABLET, FILM COATED ORAL 3 TIMES DAILY PRN
Qty: 90 TABLET | Refills: 2 | Status: SHIPPED | OUTPATIENT
Start: 2023-02-17

## 2023-02-17 RX ORDER — PRAZOSIN HYDROCHLORIDE 1 MG/1
1 CAPSULE ORAL
Qty: 30 CAPSULE | Refills: 2 | Status: SHIPPED | OUTPATIENT
Start: 2023-02-17

## 2023-02-17 RX ORDER — GABAPENTIN 400 MG/1
400 CAPSULE ORAL 3 TIMES DAILY
Qty: 90 CAPSULE | Refills: 2 | Status: SHIPPED | OUTPATIENT
Start: 2023-02-17

## 2023-02-17 RX ORDER — TRAZODONE HYDROCHLORIDE 150 MG/1
150 TABLET ORAL
Qty: 30 TABLET | Refills: 2 | Status: SHIPPED | OUTPATIENT
Start: 2023-02-17

## 2023-02-17 RX ORDER — DULOXETIN HYDROCHLORIDE 60 MG/1
60 CAPSULE, DELAYED RELEASE ORAL
Qty: 30 CAPSULE | Refills: 2 | Status: SHIPPED | OUTPATIENT
Start: 2023-02-17

## 2023-02-17 NOTE — PSYCH
This was a Telepsychiatry visit that took place through [address] [place] [address]  The patient's identity was verified via name and date of birth  Patient expressed understanding, and voluntarily agreed to proceed with the visit  This visit occurred at the address listed in the patient's chart as the patient's residence  Chief Complaint:    Depression and anxiety    Interval Summary    Pt reported that he is not doing good  He said that he was evicted from his residence  He had rented his apartment from his grandmother  He and his father worked for his [employer] and they stopped working as it hard to work under the circumstances and she was difficult to work for her  She was making accusations that they were stealing from her and taking advantage of her  OS they decided to walk away  He said that he ahd his wife are living in their parents' basement last weekend  He said it is upsetting to be in [employer] at 35  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  His wife works as well and they are looking into move into their own place soon  He said he feels depressed all the times, feels hopeless and helpless, and feels frustrated  He said she took her dog back from him and he was very close to the dog  He said this is devastating that his grandmother is doing this to them when they went out of her way to help her when she had a stroke  He said he feels anxious at times, but able to cope with it better than anxiety      Psychiatric ROS    Sleep: Disturbed mostly  Appetite: Variable  Suicidal ideation: Denied  Homicidal ideation: Denied  Psychosis: None reported  Chandni: None reported  PTSD: Disturbing dreams, wake him up at times, about daily stuff  Panic attacks: None reported  Memory issues: None reported  Side effects: None reported    D+A Use    Alcohol: Sober since 12/4/2020  Nicotine: Vapes nicotine, 6 mg cartridge last 3 days  Other substances: None    Medical Issues  Acute medical issues at present: None  Last PCP Visit: 4 months ago, no issues      Medical Review of Systems    No fever/dizziness/blurred vision/cough/chest pain/shortness of breath/abdominal pain/nausea/vomiting/bladder or bowel problems/headache/weakness/rigidity or pain reported  Any symptoms reported by patient are listed in the interval summary  Mental Status Exam    Motor: Normal gait and no abnormal movements  General appearance and behavior: Moderately kempt, calm, cooperative, pleasant, good eye contact, good rapport, no psychomotor abnormalities noted  Speech: Spontaneous with normal rate, normal volume and normal tone  Mood: "depressed"  Affect: Euthymic, congruent with mood, normal range, appropriate  Thought Process: linear  Thought content: Denied suicidal or homicidal ideation  No delusions elicited  Perception: Denied any auditory or visual hallucinations  Insight: Good  Judgment: Good  Orientation: Oriented to person, place and time  Attention/Concentration: Good  Memory - Grossly intact  Language - [address] [place] [address] - Adequate    Assessment and Plan    Major depressive disorder, recurrent, moderate - Continue duloxetine 60 mg PO daily with food in the am    Generalized anxiety disorder - Continue hydroxyzine 50 mg PO TID PRN anxiety  Taking all 3 a day in the last few weeks  - Continue gabapentin 400 mg PO TID    Posttraumatic stress disorder - Continue Prazosin 1 mg PO HS for nightmares, less frequent nightmares, no side effects reported  He sits for few minutes before he stands up from lying down position  Insomnia unspecified - Continue trazodone 150 mg PO HS    - Continue mirtazapine 7 5 mg PO HS    The clinical diagnosis, course and prognosis were explained to the patient  Discussed with patient the clinical indications, interactions, benefits, the most common and serious side effects of all current medications  The alternative treatment options were discussed  The importance of continuing in psychotherapy was reinstated   The patient was receptive and appeared to understand the information provided  Patient's concerns and questions were addressed to patient's satisfaction during the appointment, and the patient agreed to the treatment plan  Patient is aware of adverse effects of use of alcohol and/or other substances on mental illness and is aware of the risk of combining alcohol and/or substances with the medications that are currently prescribed  The patient was advised go to the nearest emergency room or call 911 if new symptoms arise or existing symptoms worsen or has thoughts of hurting self or others      Follow up in 8 weeks

## 2023-06-15 DIAGNOSIS — F41.1 GAD (GENERALIZED ANXIETY DISORDER): ICD-10-CM

## 2023-06-15 DIAGNOSIS — F33.1 MODERATE RECURRENT MAJOR DEPRESSION (HCC): ICD-10-CM

## 2023-06-15 RX ORDER — MIRTAZAPINE 15 MG/1
7.5 TABLET, FILM COATED ORAL
Qty: 15 TABLET | Refills: 0 | Status: SHIPPED | OUTPATIENT
Start: 2023-06-15

## 2023-06-15 RX ORDER — TRAZODONE HYDROCHLORIDE 150 MG/1
150 TABLET ORAL
Qty: 30 TABLET | Refills: 0 | Status: SHIPPED | OUTPATIENT
Start: 2023-06-15

## 2023-06-15 RX ORDER — PRAZOSIN HYDROCHLORIDE 1 MG/1
1 CAPSULE ORAL
Qty: 30 CAPSULE | Refills: 0 | Status: SHIPPED | OUTPATIENT
Start: 2023-06-15

## 2023-06-15 RX ORDER — GABAPENTIN 400 MG/1
400 CAPSULE ORAL 3 TIMES DAILY
Qty: 90 CAPSULE | Refills: 0 | Status: SHIPPED | OUTPATIENT
Start: 2023-06-15

## 2023-06-15 RX ORDER — DULOXETIN HYDROCHLORIDE 60 MG/1
60 CAPSULE, DELAYED RELEASE ORAL
Qty: 30 CAPSULE | Refills: 0 | Status: SHIPPED | OUTPATIENT
Start: 2023-06-15

## 2023-06-15 RX ORDER — HYDROXYZINE 50 MG/1
50 TABLET, FILM COATED ORAL 3 TIMES DAILY PRN
Qty: 90 TABLET | Refills: 0 | Status: SHIPPED | OUTPATIENT
Start: 2023-06-15

## 2023-06-15 NOTE — TELEPHONE ENCOUNTER
Patient left message requesting RF of all meds  Did not have appt scheduled  Reached out to patient and scheduled appt for 7/6 at 5:00   Forwarding RF for approval

## 2023-07-06 ENCOUNTER — TELEMEDICINE (OUTPATIENT)
Dept: PSYCHIATRY | Facility: CLINIC | Age: 33
End: 2023-07-06
Payer: COMMERCIAL

## 2023-07-06 DIAGNOSIS — F33.1 MAJOR DEPRESSIVE DISORDER, RECURRENT, MODERATE (HCC): Primary | ICD-10-CM

## 2023-07-06 DIAGNOSIS — F33.1 MODERATE RECURRENT MAJOR DEPRESSION (HCC): ICD-10-CM

## 2023-07-06 DIAGNOSIS — F41.1 GAD (GENERALIZED ANXIETY DISORDER): ICD-10-CM

## 2023-07-06 DIAGNOSIS — F43.10 POST TRAUMATIC STRESS DISORDER (PTSD): ICD-10-CM

## 2023-07-06 DIAGNOSIS — G47.00 INSOMNIA, UNSPECIFIED TYPE: ICD-10-CM

## 2023-07-06 PROCEDURE — 99214 OFFICE O/P EST MOD 30 MIN: CPT | Performed by: PSYCHIATRY & NEUROLOGY

## 2023-07-06 RX ORDER — HYDROXYZINE 50 MG/1
50 TABLET, FILM COATED ORAL 3 TIMES DAILY PRN
Qty: 90 TABLET | Refills: 2 | Status: SHIPPED | OUTPATIENT
Start: 2023-07-06

## 2023-07-06 RX ORDER — GABAPENTIN 400 MG/1
400 CAPSULE ORAL 3 TIMES DAILY
Qty: 90 CAPSULE | Refills: 2 | Status: SHIPPED | OUTPATIENT
Start: 2023-07-06

## 2023-07-06 RX ORDER — TRAZODONE HYDROCHLORIDE 150 MG/1
150 TABLET ORAL
Qty: 30 TABLET | Refills: 2 | Status: SHIPPED | OUTPATIENT
Start: 2023-07-06

## 2023-07-06 RX ORDER — MIRTAZAPINE 15 MG/1
7.5 TABLET, FILM COATED ORAL
Qty: 15 TABLET | Refills: 2 | Status: SHIPPED | OUTPATIENT
Start: 2023-07-06

## 2023-07-06 RX ORDER — DULOXETIN HYDROCHLORIDE 60 MG/1
60 CAPSULE, DELAYED RELEASE ORAL
Qty: 30 CAPSULE | Refills: 2 | Status: SHIPPED | OUTPATIENT
Start: 2023-07-06

## 2023-07-06 RX ORDER — PRAZOSIN HYDROCHLORIDE 1 MG/1
1 CAPSULE ORAL
Qty: 30 CAPSULE | Refills: 2 | Status: SHIPPED | OUTPATIENT
Start: 2023-07-06

## 2023-07-06 RX ORDER — DULOXETIN HYDROCHLORIDE 30 MG/1
30 CAPSULE, DELAYED RELEASE ORAL
Qty: 30 CAPSULE | Refills: 2 | Status: SHIPPED | OUTPATIENT
Start: 2023-07-06

## 2023-07-06 NOTE — PSYCH
This was a Telepsychiatry visit that took place through [address] [place] [address]. The patient's identity was verified via name and date of birth. Patient expressed understanding, and voluntarily agreed to proceed with the visit. This visit occurred at the address listed in the patient's chart as the patient's residence. Chief Complaint:    Depression and anxiety    Interval Summary    Pt said he has not been doing good since his last visit. He said he and his wife moved in with his parents and live in their basement. He said that his grandmother took the dog away that she gave him 1.5 years ago, out of spite for him as he stopped working for her. He said that he recently put down his dog. 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 He said he is doing well at home as his parents are supportive. He said his wife works as well, but they are not able to afford to move out. He said he feels miserable, depressed and irritable with his current situation. He said his meds are not working well for him. He was willing florencio start therapy soon. Psychiatric ROS    Sleep: Variable, some nights 8-9 hours and at others only about 5 hours  Appetite: Better  Suicidal ideation: Denied  Homicidal ideation: Denied  Psychosis: None reported  Chandni: None reported  PTSD: None reported  Panic attacks: None reported  Memory issues: None reported  Side effects: None reported    D+A Use    Alcohol: Sober since 12/4/2020  Nicotine: Vapes nicotine, 6 mg cartridge last 3 days  Other substances: None    Medical Issues  Acute medical issues at present: None  Last PCP Visit: 6 months ago, no issues      Medical Review of Systems    No fever/dizziness/blurred vision/cough/chest pain/shortness of breath/abdominal pain/nausea/vomiting/bladder or bowel problems/headache/weakness/rigidity or pain reported. Any symptoms reported by patient are listed in the interval summary.     Mental Status Exam    Motor: Normal gait and no abnormal movements  General appearance and behavior: Moderately kempt, calm, cooperative, pleasant, good eye contact, good rapport, no psychomotor abnormalities noted  Speech: Spontaneous with normal rate, normal volume and normal tone  Mood: "depressed"  Affect: Depressed, congruent with mood, normal range, appropriate  Thought Process: linear  Thought content: Denied suicidal or homicidal ideation. No delusions elicited  Perception: Denied any auditory or visual hallucinations  Insight: Good  Judgment: Good  Orientation: Oriented to person, place and time  Attention/Concentration: Good  Memory - Grossly intact  Language - [address] of Knowledge - Adequate    Assessment and Plan    Major depressive disorder, recurrent, moderate - Increase duloxetine 90 mg PO daily with breakfast    Generalized anxiety disorder - Continue hydroxyzine 50 mg PO TID PRN anxiety. Taking all 3 a day in the last few weeks. - Continue gabapentin 400 mg PO TID    Posttraumatic stress disorder - Continue Prazosin 1 mg PO HS for nightmares and tolerating well    Insomnia unspecified - Continue trazodone 150 mg PO HS    - Continue mirtazapine 7.5 mg PO HS    Advised to request for therapy and to added to the waiting list at [facility]    The clinical diagnosis, course and prognosis were explained to the patient. Discussed with patient the clinical indications, interactions, benefits, the most common and serious side effects of all current medications. The alternative treatment options were discussed. The importance of continuing in psychotherapy was reinstated. The patient was receptive and appeared to understand the information provided. Patient's concerns and questions were addressed to patient's satisfaction during the appointment, and the patient agreed to the treatment plan.     Patient is aware of adverse effects of use of alcohol and/or other substances on mental illness and is aware of the risk of combining alcohol and/or substances with the medications that are currently prescribed. The patient was advised go to the nearest emergency room or call 911 if new symptoms arise or existing symptoms worsen or has thoughts of hurting self or others.     Follow up in 4 weeks

## 2023-07-27 ENCOUNTER — TELEPHONE (OUTPATIENT)
Dept: PSYCHIATRY | Facility: CLINIC | Age: 33
End: 2023-07-27

## 2023-08-04 ENCOUNTER — TELEMEDICINE (OUTPATIENT)
Dept: PSYCHIATRY | Facility: CLINIC | Age: 33
End: 2023-08-04

## 2023-08-04 DIAGNOSIS — Z91.199 NO-SHOW FOR APPOINTMENT: Primary | ICD-10-CM

## 2023-08-18 ENCOUNTER — TELEMEDICINE (OUTPATIENT)
Dept: PSYCHIATRY | Facility: CLINIC | Age: 33
End: 2023-08-18
Payer: COMMERCIAL

## 2023-08-18 DIAGNOSIS — G47.00 INSOMNIA, UNSPECIFIED TYPE: ICD-10-CM

## 2023-08-18 DIAGNOSIS — F41.1 GENERALIZED ANXIETY DISORDER: ICD-10-CM

## 2023-08-18 DIAGNOSIS — F33.1 MAJOR DEPRESSIVE DISORDER, RECURRENT, MODERATE (HCC): Primary | ICD-10-CM

## 2023-08-18 DIAGNOSIS — F33.1 MODERATE RECURRENT MAJOR DEPRESSION (HCC): ICD-10-CM

## 2023-08-18 DIAGNOSIS — R11.0 NAUSEA: ICD-10-CM

## 2023-08-18 DIAGNOSIS — F41.1 GAD (GENERALIZED ANXIETY DISORDER): ICD-10-CM

## 2023-08-18 DIAGNOSIS — F43.10 POST TRAUMATIC STRESS DISORDER (PTSD): ICD-10-CM

## 2023-08-18 PROCEDURE — 99213 OFFICE O/P EST LOW 20 MIN: CPT | Performed by: PSYCHIATRY & NEUROLOGY

## 2023-08-18 RX ORDER — HYDROXYZINE 50 MG/1
50 TABLET, FILM COATED ORAL 3 TIMES DAILY PRN
Qty: 90 TABLET | Refills: 2 | Status: SHIPPED | OUTPATIENT
Start: 2023-08-18

## 2023-08-18 RX ORDER — FLUOXETINE 10 MG/1
10 CAPSULE ORAL DAILY
Qty: 30 CAPSULE | Refills: 2 | Status: SHIPPED | OUTPATIENT
Start: 2023-08-18

## 2023-08-18 RX ORDER — PRAZOSIN HYDROCHLORIDE 1 MG/1
1 CAPSULE ORAL
Qty: 30 CAPSULE | Refills: 2 | Status: SHIPPED | OUTPATIENT
Start: 2023-08-18

## 2023-08-18 RX ORDER — TRAZODONE HYDROCHLORIDE 150 MG/1
150 TABLET ORAL
Qty: 30 TABLET | Refills: 2 | Status: SHIPPED | OUTPATIENT
Start: 2023-08-18

## 2023-08-18 RX ORDER — GABAPENTIN 400 MG/1
400 CAPSULE ORAL 3 TIMES DAILY
Qty: 90 CAPSULE | Refills: 2 | Status: SHIPPED | OUTPATIENT
Start: 2023-08-18

## 2023-08-18 RX ORDER — DULOXETIN HYDROCHLORIDE 60 MG/1
60 CAPSULE, DELAYED RELEASE ORAL
Qty: 30 CAPSULE | Refills: 2 | Status: SHIPPED | OUTPATIENT
Start: 2023-08-18

## 2023-08-18 RX ORDER — MIRTAZAPINE 15 MG/1
7.5 TABLET, FILM COATED ORAL
Qty: 15 TABLET | Refills: 2 | Status: SHIPPED | OUTPATIENT
Start: 2023-08-18

## 2023-08-18 NOTE — PSYCH
This was a Telepsychiatry visit that took place through [address] [place] [address]. The patient's identity was verified via name and date of birth. Patient expressed understanding, and voluntarily agreed to proceed with the visit. This visit occurred at the address listed in the patient's chart as the patient's residence. Chief Complaint:    Depression and anxiety    Interval Summary    Pt said he is not doing good since his last visit. He continues to be depressed most days, all through the day, struggling at work and at home and he feels overwhelmed. He is much less anxious and he is able to cope better with it. He is exhausted all the time, poor motivation, no interest/plesaure in anything and not sleeping well. Je wakes up frequently and unable to sleep. He said he is stressed about not having his own space and continues to live in his parents' garage. He is irritable with his situation and he feels stuck and has no motivation to get out the situation. He has good relationship with wife and parents. He c/o no sex drive and has no sex life. Psychiatric ROS    Sleep: Disturbed, 8 hours, interrupted, stays up few hours at night  Appetite: Good  Suicidal ideation: Denied, but feels helpless at times  Homicidal ideation: Denied  Psychosis: None reported  Chandni: None reported  PTSD: None reported  Panic attacks: None reported  Memory issues: None reported  Side effects: None reported    D+A Use    Alcohol: Sober since 12/4/2020  Nicotine: Vapes nicotine, 6 mg cartridge last 3 days  Other substances: None    Medical Issues  Acute medical issues at present: None  Last PCP Visit: 7 months ago, no issues      Medical Review of Systems    No fever/dizziness/blurred vision/cough/chest pain/shortness of breath/abdominal pain/nausea/vomiting/bladder or bowel problems/headache/weakness/rigidity or pain reported. Any symptoms reported by patient are listed in the interval summary.     Mental Status Exam    Motor: Normal gait and no abnormal movements  General appearance and behavior: Moderately kempt, calm, cooperative, pleasant, good eye contact, good rapport, no psychomotor abnormalities noted  Speech: Spontaneous with normal rate, normal volume and normal tone  Mood: "depressed"  Affect: Depressed, congruent with mood, normal range, appropriate  Thought Process: linear  Thought content: Denied suicidal or homicidal ideation. No delusions elicited  Perception: Denied any auditory or visual hallucinations  Insight: Good  Judgment: Good  Orientation: Oriented to person, place and time  Attention/Concentration: Good  Memory - Grossly intact  Language - [address] - Adequate    Assessment and Plan    Major depressive disorder, recurrent, moderate - Decrease duloxetine to 60 mg PO daily with breakfast    - Start fluoxetine 10 mg Po daily with breakfast.    Generalized anxiety disorder - Continue hydroxyzine 50 mg PO TID PRN anxiety. Taking all 3 a day in the last few weeks. - Continue gabapentin 400 mg PO TID    Posttraumatic stress disorder - Continue Prazosin 1 mg PO HS for nightmares and tolerating well    Insomnia unspecified - Continue trazodone 150 mg PO HS. This helps him sleep    - Continue mirtazapine 7.5 mg PO HS    D/w pt about the risk of serotonin syndrome and pt said he is aware of the symptoms and advised him to stop his antidperessants and go to the ER is nay of these symptoms occur - agitation, tremor, rigidity, confusion. Advised to request for therapy and to added to the waiting list at [facility]. He has not heard back yet. The clinical diagnosis, course and prognosis were explained to the patient. Discussed with patient the clinical indications, interactions, benefits, the most common and serious side effects of all current medications. The alternative treatment options were discussed. The importance of continuing in psychotherapy was reinstated.  The patient was receptive and appeared to understand the information provided. Patient's concerns and questions were addressed to patient's satisfaction during the appointment, and the patient agreed to the treatment plan. Patient is aware of adverse effects of use of alcohol and/or other substances on mental illness and is aware of the risk of combining alcohol and/or substances with the medications that are currently prescribed. The patient was advised go to the nearest emergency room or call 911 if new symptoms arise or existing symptoms worsen or has thoughts of hurting self or others.     Follow up in 4 weeks

## 2023-08-21 ENCOUNTER — TELEPHONE (OUTPATIENT)
Dept: PSYCHIATRY | Facility: CLINIC | Age: 33
End: 2023-08-21

## 2023-08-21 NOTE — TELEPHONE ENCOUNTER
[address] calling regarding potential drug interaction between patients fluoxetine and  Mirtazapine stating increased risk for serotonin syndrome. Forwarding to provider for review.

## 2023-08-25 NOTE — TELEPHONE ENCOUNTER
Spoke to pharmacist that pt is aware of the risk and he will go to the ER if symptoms occur. Pt has no improvement in his depression and wants to be on the combination.

## 2023-08-25 NOTE — TELEPHONE ENCOUNTER
Pharmacist called again about this interaction, they need a call back before they can fill the fluoxetine

## 2023-10-18 ENCOUNTER — TELEPHONE (OUTPATIENT)
Dept: PSYCHIATRY | Facility: CLINIC | Age: 33
End: 2023-10-18

## 2023-10-18 NOTE — TELEPHONE ENCOUNTER
Spoke to pt in regards to therapy services. Pt was looking for therapy after 430pm due to work. Writer was unable to accommodate pt preferences. Pt will reach out to insurance to find a provider who can.

## 2023-10-18 NOTE — TELEPHONE ENCOUNTER
Client left a message that he needed to schedule with Dr Callie Casillas (apt made 11/7/23 @ 5:40 pm) and a therapist. Informed client that their is a waitlist for therapy, but I would forward message to Intake [phone] to do an outreach. Celeste Crouch

## 2023-11-07 ENCOUNTER — TELEMEDICINE (OUTPATIENT)
Dept: PSYCHIATRY | Facility: CLINIC | Age: 33
End: 2023-11-07
Payer: COMMERCIAL

## 2023-11-07 DIAGNOSIS — F41.1 GAD (GENERALIZED ANXIETY DISORDER): ICD-10-CM

## 2023-11-07 DIAGNOSIS — F43.10 POST TRAUMATIC STRESS DISORDER (PTSD): ICD-10-CM

## 2023-11-07 DIAGNOSIS — F33.1 MODERATE RECURRENT MAJOR DEPRESSION (HCC): ICD-10-CM

## 2023-11-07 DIAGNOSIS — G47.00 INSOMNIA, UNSPECIFIED TYPE: ICD-10-CM

## 2023-11-07 DIAGNOSIS — F41.1 GENERALIZED ANXIETY DISORDER: ICD-10-CM

## 2023-11-07 DIAGNOSIS — F33.1 MAJOR DEPRESSIVE DISORDER, RECURRENT, MODERATE (HCC): Primary | ICD-10-CM

## 2023-11-07 PROCEDURE — 99213 OFFICE O/P EST LOW 20 MIN: CPT | Performed by: PSYCHIATRY & NEUROLOGY

## 2023-11-07 RX ORDER — HYDROXYZINE 50 MG/1
50 TABLET, FILM COATED ORAL 3 TIMES DAILY PRN
Qty: 90 TABLET | Refills: 2 | Status: SHIPPED | OUTPATIENT
Start: 2023-11-07

## 2023-11-07 RX ORDER — FLUOXETINE 10 MG/1
10 CAPSULE ORAL DAILY
Qty: 30 CAPSULE | Refills: 2 | Status: SHIPPED | OUTPATIENT
Start: 2023-11-07

## 2023-11-07 RX ORDER — GABAPENTIN 400 MG/1
400 CAPSULE ORAL 3 TIMES DAILY
Qty: 90 CAPSULE | Refills: 2 | Status: SHIPPED | OUTPATIENT
Start: 2023-11-07

## 2023-11-07 RX ORDER — MIRTAZAPINE 15 MG/1
7.5 TABLET, FILM COATED ORAL
Qty: 15 TABLET | Refills: 2 | Status: SHIPPED | OUTPATIENT
Start: 2023-11-07

## 2023-11-07 RX ORDER — DULOXETIN HYDROCHLORIDE 60 MG/1
60 CAPSULE, DELAYED RELEASE ORAL
Qty: 30 CAPSULE | Refills: 2 | Status: SHIPPED | OUTPATIENT
Start: 2023-11-07

## 2023-11-07 NOTE — PSYCH
After connecting through [employer], patient was verified with two unique identifiers. Patient (or authorized legal representative) was then informed that this was a Telemedicine visit and that the exam was being conducted confidentially over secure lines. My office door was closed. No one else was in the room with me. Patient acknowledged consent and understanding of privacy and security of the Telemedicine visit, and gave permission to have a telemedicine presenter stay in the room in order to assist with the history and to conduct the exam as needed. I informed the patient that I have reviewed their record in Epic and presented the opportunity for them to ask any questions regarding the visit today. The patient agreed to participate. This was a Telepsychiatry visit that took place through [address] [place] [address]. The patient's identity was verified via name and date of birth. Patient expressed understanding, and voluntarily agreed to proceed with the visit. This visit occurred at the address listed in the patient's chart as the patient's residence. Chief Complaint:    Depression and anxiety    Interval Summary    Pt said he is not doing good since his last visit. He said that he continues to be depressed all day long, most days, sad mood, poor energy levels, anhedonia, poor motivation and is overwhelmed at times. He feels anxious at times, secondary to his negative thoughts and he is unable to come out of that. He is working full time. He is stressed about his finances, trying to save to get his own place, living in his parents' basement. His wife works as well and they are saving money. He is trying to fix his car and it is back in the shop. He said he had to put his dog down few weeks ago. He is irritable and has poor frustration tolerance.     Psychiatric ROS    Sleep: Better, wakes up few times, feels tired during the day, interrupted 8 hours  Appetite: Good  Suicidal ideation: Denied  Homicidal ideation: Denied  Psychosis: None reported  Chandni: None reported  PTSD: None reported, vivid dreams+  Panic attacks: None reported  Memory issues: None reported  Side effects: None reported    D+A Use    Alcohol: Sober since 12/4/2020  Nicotine: Vapes nicotine, 6 mg cartridge last 3 days  Other substances: None    Medical Issues  Acute medical issues at present: None  Last PCP Visit: 8 months ago, no issues    Medical Review of Systems    No fever/dizziness/blurred vision/cough/chest pain/shortness of breath/abdominal pain/nausea/vomiting/bladder or bowel problems/headache/weakness/rigidity or pain reported. Any symptoms reported by patient are listed in the interval summary. Mental Status Exam    Motor: Normal gait and no abnormal movements  General appearance and behavior: Moderately kempt, calm, cooperative, pleasant, good eye contact, good rapport, no psychomotor abnormalities noted  Speech: Spontaneous with normal rate, normal volume and normal tone  Mood: "depressed"  Affect: Depressed, congruent with mood, normal range, appropriate  Thought Process: linear  Thought content: Denied suicidal or homicidal ideation. No delusions elicited  Perception: Denied any auditory or visual hallucinations  Insight: Good  Judgment: Good  Orientation: Oriented to person, place and time  Attention/Concentration: Good  Memory - Grossly intact  Language - [address] - Adequate    Assessment and Plan    Major depressive disorder, recurrent, moderate - Decrease duloxetine to 30 mg PO daily with breakfast    - Increase fluoxetine 20 mg Po daily with breakfast.    Generalized anxiety disorder - Continue hydroxyzine 50 mg PO TID PRN anxiety. Taking all 3 a day in the last few weeks. - Continue gabapentin 400 mg PO TID    Posttraumatic stress disorder - Continue Prazosin 1 mg PO HS for nightmares and tolerating well    Insomnia unspecified - Continue trazodone 150 mg PO HS.  This helps him sleep    - Continue mirtazapine 7.5 mg PO HS    D/w pt about the risk of serotonin syndrome and pt said he is aware of the symptoms and advised him to stop his antidperessants and go to the ER is nay of these symptoms occur - agitation, tremor, rigidity, confusion. Advised to request for therapy and to added to the waiting list at [facility]. He has not heard back yet. He has not found therapists doing after hour appts and he cannot take off from work during the day. The clinical diagnosis, course and prognosis were explained to the patient. Discussed with patient the clinical indications, interactions, benefits, the most common and serious side effects of all current medications. The alternative treatment options were discussed. The importance of continuing in psychotherapy was reinstated. The patient was receptive and appeared to understand the information provided. Patient's concerns and questions were addressed to patient's satisfaction during the appointment, and the patient agreed to the treatment plan. Patient is aware of adverse effects of use of alcohol and/or other substances on mental illness and is aware of the risk of combining alcohol and/or substances with the medications that are currently prescribed. The patient was advised go to the nearest emergency room or call 911 if new symptoms arise or existing symptoms worsen or has thoughts of hurting self or others. Follow up in 4 weeks      Behavioral Health OP Treatment Plan      Name and Date of Birth:  Tobias Cutler 35 y.o. 1990    Date of Treatment Plan: November 7, 2023    Diagnosis/Diagnoses:     1. Major depressive disorder, recurrent, moderate (HCC)    2. Generalized anxiety disorder    3. Post traumatic stress disorder (PTSD)    4. Insomnia, unspecified type          Strengths/Personal Resources for Self-Care: Good insight, compliant with treatment, good family support    Area/Areas of need (in own words):    1.  Long Term Goal: Management of symptoms  Target Date: 6 months  Person/Persons responsible for completion of goal: Patient    2. Short Term Objective(s): How will we reach this goal - Continue medications as prescribed  Target Date: 6 months  Person/Persons Responsible for Completion of Goal: Patient    Progress Towards Goals: Stable    Treatment Modality: Medication management every two months    Review due in 180 days from the date of this plan    Expected length of service: Follow up appointment every 1-2 months for maintenance    My Physician/PA/NP and I have developed this plan together and I agree to work on the goals and objectives. I understand the treatment goals that were developed for my treatment. I consent to the above treatment plan.     Start time: 543 PM    Stop time: 555 PM

## 2023-12-07 ENCOUNTER — TELEMEDICINE (OUTPATIENT)
Dept: PSYCHIATRY | Facility: CLINIC | Age: 33
End: 2023-12-07
Payer: COMMERCIAL

## 2023-12-07 DIAGNOSIS — F43.10 POST TRAUMATIC STRESS DISORDER (PTSD): ICD-10-CM

## 2023-12-07 DIAGNOSIS — F33.1 MODERATE RECURRENT MAJOR DEPRESSION (HCC): ICD-10-CM

## 2023-12-07 DIAGNOSIS — G47.00 INSOMNIA, UNSPECIFIED TYPE: ICD-10-CM

## 2023-12-07 DIAGNOSIS — F41.1 GAD (GENERALIZED ANXIETY DISORDER): ICD-10-CM

## 2023-12-07 DIAGNOSIS — F41.1 GENERALIZED ANXIETY DISORDER: ICD-10-CM

## 2023-12-07 DIAGNOSIS — F33.1 MAJOR DEPRESSIVE DISORDER, RECURRENT, MODERATE (HCC): Primary | ICD-10-CM

## 2023-12-07 PROCEDURE — 99213 OFFICE O/P EST LOW 20 MIN: CPT | Performed by: PSYCHIATRY & NEUROLOGY

## 2023-12-07 RX ORDER — MIRTAZAPINE 15 MG/1
7.5 TABLET, FILM COATED ORAL
Qty: 15 TABLET | Refills: 2 | Status: SHIPPED | OUTPATIENT
Start: 2023-12-07

## 2023-12-07 RX ORDER — HYDROXYZINE 50 MG/1
50 TABLET, FILM COATED ORAL 3 TIMES DAILY PRN
Qty: 90 TABLET | Refills: 2 | Status: SHIPPED | OUTPATIENT
Start: 2023-12-07

## 2023-12-07 RX ORDER — DULOXETIN HYDROCHLORIDE 30 MG/1
30 CAPSULE, DELAYED RELEASE ORAL
Qty: 30 CAPSULE | Refills: 3 | Status: SHIPPED | OUTPATIENT
Start: 2023-12-07

## 2023-12-07 RX ORDER — FLUOXETINE HYDROCHLORIDE 20 MG/1
20 CAPSULE ORAL DAILY
Qty: 30 CAPSULE | Refills: 2 | Status: SHIPPED | OUTPATIENT
Start: 2023-12-07

## 2023-12-07 RX ORDER — TRAZODONE HYDROCHLORIDE 150 MG/1
150 TABLET ORAL
Qty: 30 TABLET | Refills: 2 | Status: SHIPPED | OUTPATIENT
Start: 2023-12-07

## 2023-12-07 RX ORDER — PRAZOSIN HYDROCHLORIDE 1 MG/1
1 CAPSULE ORAL
Qty: 30 CAPSULE | Refills: 2 | Status: SHIPPED | OUTPATIENT
Start: 2023-12-07

## 2023-12-07 NOTE — PSYCH
After connecting through [employer], patient was verified with two unique identifiers. Patient (or authorized legal representative) was then informed that this was a Telemedicine visit and that the exam was being conducted confidentially over secure lines. My office door was closed. No one else was in the room with me. Patient acknowledged consent and understanding of privacy and security of the Telemedicine visit, and gave permission to have a telemedicine presenter stay in the room in order to assist with the history and to conduct the exam as needed. I informed the patient that I have reviewed their record in Epic and presented the opportunity for them to ask any questions regarding the visit today. The patient agreed to participate. After connecting through [employer], patient was verified with two unique identifiers. Patient (or authorized legal representative) was then informed that this was a Telemedicine visit and that the exam was being conducted confidentially over secure lines. My office door was closed. No one else was in the room with me. Patient acknowledged consent and understanding of privacy and security of the Telemedicine visit, and gave permission to have a telemedicine presenter stay in the room in order to assist with the history and to conduct the exam as needed. I informed the patient that I have reviewed their record in Epic and presented the opportunity for them to ask any questions regarding the visit today. The patient agreed to participate. This was a Telepsychiatry visit that took place through [address] [place] [address]. The patient's identity was verified via name and date of birth. Patient expressed understanding, and voluntarily agreed to proceed with the visit. This visit occurred at the address listed in the patient's chart as the patient's residence. Chief Complaint:    Depression and anxiety    Interval Summary    Pt said he is not doing good since his last visit.  He said that he did not get an increase in his fluoxetine since his last visit. He said that he is depressed daily, all through the day, struggling to get through his day, and he is overwhelmed at times. He said that his grandmother I suing him for 12k for damages for the pace he was renting from her. He said that his anxiety is worsened, daily and unable to cope with it at times. He said that his father is upset with whta his mom doing to the patient. He is stressed about his living situation and finances. Psychiatric ROS    Sleep: Disturbed, interrupted 8 hours  Appetite: Good  Suicidal ideation: Denied  Homicidal ideation: Denied  Psychosis: None reported  Chandni: None reported  PTSD: None reported, vivid dreams+  Panic attacks: None reported  Memory issues: None reported  Side effects: None reported    D+A Use    Alcohol: Sober since 12/4/2020  Nicotine: Vapes nicotine, 6 mg cartridge last 3 days  Other substances: None    Medical Issues  Acute medical issues at present: None  Last PCP Visit: 10 months ago, no issues    Medical Review of Systems    No fever/dizziness/blurred vision/cough/chest pain/shortness of breath/abdominal pain/nausea/vomiting/bladder or bowel problems/headache/weakness/rigidity or pain reported. Any symptoms reported by patient are listed in the interval summary. Mental Status Exam    Motor: Normal gait and no abnormal movements  General appearance and behavior: Moderately kempt, calm, cooperative, pleasant, good eye contact, good rapport, no psychomotor abnormalities noted  Speech: Spontaneous with normal rate, normal volume and normal tone  Mood: "depressed"  Affect: Depressed, congruent with mood, normal range, appropriate  Thought Process: linear  Thought content: Denied suicidal or homicidal ideation.  No delusions elicited  Perception: Denied any auditory or visual hallucinations  Insight: Good  Judgment: Good  Orientation: Oriented to person, place and time  Attention/Concentration: Good  Memory - Grossly intact  Language - [address] - Adequate    Assessment and Plan    Major depressive disorder, recurrent, moderate - Decrease duloxetine to 30 mg PO daily with breakfast    - Increase fluoxetine 20 mg Po daily with breakfast.    Generalized anxiety disorder - Continue hydroxyzine 50 mg PO TID PRN anxiety. Taking all 3 a day in the last few weeks. - Continue gabapentin 400 mg PO TID    Posttraumatic stress disorder - Continue Prazosin 1 mg PO HS for nightmares and tolerating well    Insomnia unspecified - Continue trazodone 150 mg PO HS. This helps him sleep    - Continue mirtazapine 7.5 mg PO HS    D/w pt about the risk of serotonin syndrome and pt said he is aware of the symptoms and advised him to stop his antidperessants and go to the ER is nay of these symptoms occur - agitation, tremor, rigidity, confusion. Advised to request for therapy and to added to the waiting list at [facility]. He has not heard back yet. He has not found therapists doing after hour appts and he cannot take off from work during the day. The clinical diagnosis, course and prognosis were explained to the patient. Discussed with patient the clinical indications, interactions, benefits, the most common and serious side effects of all current medications. The alternative treatment options were discussed. The importance of continuing in psychotherapy was reinstated. The patient was receptive and appeared to understand the information provided. Patient's concerns and questions were addressed to patient's satisfaction during the appointment, and the patient agreed to the treatment plan. Patient is aware of adverse effects of use of alcohol and/or other substances on mental illness and is aware of the risk of combining alcohol and/or substances with the medications that are currently prescribed.  The patient was advised go to the nearest emergency room or call 911 if new symptoms arise or existing symptoms worsen or has thoughts of hurting self or others. Follow up in 4 weeks    Behavioral Health OP Treatment Plan      Name and Date of Birth:  Kayla Langley 35 y.o. 1990    Date of Treatment Plan: December 7, 2023    Diagnosis/Diagnoses:     1. Major depressive disorder, recurrent, moderate (HCC)    2. Generalized anxiety disorder    3. Post traumatic stress disorder (PTSD)    4. Insomnia, unspecified type          Strengths/Personal Resources for Self-Care: Good insight, compliant with treatment, good family support    Area/Areas of need (in own words):    1. Long Term Goal: Management of symptoms  Target Date: 6 months  Person/Persons responsible for completion of goal: Patient    2. Short Term Objective(s): How will we reach this goal - Continue medications as prescribed  Target Date: 6 months  Person/Persons Responsible for Completion of Goal: Patient    Progress Towards Goals: Stable    Treatment Modality: Medication management every two months    Review due in 180 days from the date of this plan    Expected length of service: Follow up appointment every 1-2 months for maintenance    My Physician/PA/NP and I have developed this plan together and I agree to work on the goals and objectives. I understand the treatment goals that were developed for my treatment. I consent to the above treatment plan.     Start time: 545 PM    Stop time: 605 PM

## 2024-01-09 ENCOUNTER — TELEMEDICINE (OUTPATIENT)
Dept: PSYCHIATRY | Facility: CLINIC | Age: 34
End: 2024-01-09
Payer: COMMERCIAL

## 2024-01-09 DIAGNOSIS — F33.1 MAJOR DEPRESSIVE DISORDER, RECURRENT, MODERATE (HCC): Primary | ICD-10-CM

## 2024-01-09 DIAGNOSIS — F33.1 MODERATE RECURRENT MAJOR DEPRESSION (HCC): ICD-10-CM

## 2024-01-09 DIAGNOSIS — F43.10 POST TRAUMATIC STRESS DISORDER (PTSD): ICD-10-CM

## 2024-01-09 DIAGNOSIS — G47.00 INSOMNIA, UNSPECIFIED TYPE: ICD-10-CM

## 2024-01-09 DIAGNOSIS — F41.1 GENERALIZED ANXIETY DISORDER: ICD-10-CM

## 2024-01-09 DIAGNOSIS — F41.1 GAD (GENERALIZED ANXIETY DISORDER): ICD-10-CM

## 2024-01-09 PROCEDURE — 99213 OFFICE O/P EST LOW 20 MIN: CPT | Performed by: PSYCHIATRY & NEUROLOGY

## 2024-01-09 RX ORDER — GABAPENTIN 400 MG/1
400 CAPSULE ORAL 3 TIMES DAILY
Qty: 90 CAPSULE | Refills: 2 | Status: SHIPPED | OUTPATIENT
Start: 2024-01-09

## 2024-01-09 RX ORDER — PRAZOSIN HYDROCHLORIDE 1 MG/1
1 CAPSULE ORAL
Qty: 30 CAPSULE | Refills: 2 | Status: SHIPPED | OUTPATIENT
Start: 2024-01-09

## 2024-01-09 RX ORDER — HYDROXYZINE 50 MG/1
50 TABLET, FILM COATED ORAL 3 TIMES DAILY PRN
Qty: 90 TABLET | Refills: 2 | Status: SHIPPED | OUTPATIENT
Start: 2024-01-09

## 2024-01-09 RX ORDER — MIRTAZAPINE 15 MG/1
7.5 TABLET, FILM COATED ORAL
Qty: 15 TABLET | Refills: 3 | Status: SHIPPED | OUTPATIENT
Start: 2024-01-09

## 2024-01-09 RX ORDER — FLUOXETINE 10 MG/1
10 CAPSULE ORAL DAILY
Qty: 30 CAPSULE | Refills: 3 | Status: SHIPPED | OUTPATIENT
Start: 2024-01-09

## 2024-01-09 RX ORDER — FLUOXETINE HYDROCHLORIDE 20 MG/1
20 CAPSULE ORAL DAILY
Qty: 30 CAPSULE | Refills: 3 | Status: SHIPPED | OUTPATIENT
Start: 2024-01-09

## 2024-01-09 NOTE — PSYCH
After connecting through [employer], patient was verified with two unique identifiers.  Patient (or authorized legal representative) was then informed that this was a Telemedicine visit and that the exam was being conducted confidentially over secure lines. My office door was closed.  No one else was in the room with me.  Patient acknowledged consent and understanding of privacy and security of the Telemedicine visit, and gave permission to have a telemedicine presenter stay in the room in order to assist with the history and to conduct the exam as needed.  I informed the patient that I have reviewed their record in Epic and presented the opportunity for them to ask any questions regarding the visit today.  The patient agreed to participate.    Chief Complaint:    Depression and anxiety    Interval Summary    Pt said he is not doing good since his last visit. He said he lost his law suit against his grandmother and he has to pay her 8K. He plans to pay her off in the next few weeks and hoping she does not appeal the verdict requesting more money. He said feels depressed and anxious most days, all day long mostly and he feels overwhelmed most days. He said he is stressed about his finances and living situation. He is doing okay at work and wife works too.     Psychiatric ROS    Sleep: Disturbed, interrupted 8 hours, feels tired during the day  Appetite: Good  Suicidal ideation: Denied  Homicidal ideation: Denied  Psychosis: None reported  Chandni: None reported  PTSD: None reported, vivid dreams+  Panic attacks: None reported  Memory issues: None reported  Side effects: None reported    D+A Use    Alcohol: Sober since 12/4/2020  Nicotine: Vapes nicotine, 6 mg cartridge last 3 days  Other substances: None    Medical Issues  Acute medical issues at present: None  Last PCP Visit: 11 months ago, no issues    Medical Review of Systems    No fever/dizziness/blurred vision/cough/chest pain/shortness of breath/abdominal  "pain/nausea/vomiting/bladder or bowel problems/headache/weakness/rigidity or pain reported. Any symptoms reported by patient are listed in the interval summary.    Mental Status Exam    Motor: Normal gait and no abnormal movements  General appearance and behavior: Moderately kempt, calm, cooperative, pleasant, good eye contact, good rapport, no psychomotor abnormalities noted  Speech: Spontaneous with normal rate, normal volume and normal tone  Mood: \"depressed and anxious\"  Affect: Depressed, congruent with mood, normal range, appropriate  Thought Process: linear  Thought content: Denied suicidal or homicidal ideation. No delusions elicited  Perception: Denied any auditory or visual hallucinations  Insight: Good  Judgment: Good  Orientation: Oriented to person, place and time  Attention/Concentration: Good  Memory - Grossly intact  Language - Fluent  Fund of Knowledge - Adequate    Assessment and Plan    Major depressive disorder, recurrent, moderate - Stop duloxetine    - Increase fluoxetine 30 mg Po daily with breakfast.    Generalized anxiety disorder - Continue hydroxyzine 50 mg PO TID PRN anxiety. Taking all 3 a day in the last few weeks.    - Continue gabapentin 400 mg PO TID    Posttraumatic stress disorder - Continue Prazosin 1 mg PO HS for nightmares and tolerating well    Insomnia unspecified - Continue trazodone 150 mg PO HS. This helps him sleep    - Continue mirtazapine 7.5 mg PO HS    D/w pt about the risk of serotonin syndrome and pt said he is aware of the symptoms and advised him to stop his antidperessants and go to the ER is any of these symptoms occur - agitation, tremor, rigidity, confusion.     Advised to request for therapy and to added to the waiting list at [facility]. He has not heard back yet. He has not found therapists doing after hour appts and he cannot take off from work during the day.    The clinical diagnosis, course and prognosis were explained to the patient. Discussed with patient " the clinical indications, interactions, benefits, the most common and serious side effects of all current medications. The alternative treatment options were discussed. The importance of continuing in psychotherapy was reinstated. The patient was receptive and appeared to understand the information provided. Patient's concerns and questions were addressed to patient's satisfaction during the appointment, and the patient agreed to the treatment plan.    Patient is aware of adverse effects of use of alcohol and/or other substances on mental illness and is aware of the risk of combining alcohol and/or substances with the medications that are currently prescribed. The patient was advised go to the nearest emergency room or call 911 if new symptoms arise or existing symptoms worsen or has thoughts of hurting self or others.    Follow up in 4 weeks    Behavioral Health OP Treatment Plan      Name and Date of Birth:  Misael Grace 33 y.o. 1990    Date of Treatment Plan: January 9, 2024    Diagnosis/Diagnoses:     1. Major depressive disorder, recurrent, moderate (HCC)    2. Generalized anxiety disorder    3. Post traumatic stress disorder (PTSD)    4. Insomnia, unspecified type          Strengths/Personal Resources for Self-Care: Good insight, compliant with treatment, good family support    Area/Areas of need (in own words):    1. Long Term Goal: Management of symptoms  Target Date: 6 months  Person/Persons responsible for completion of goal: Patient    2.  Short Term Objective(s):  How will we reach this goal - Continue medications as prescribed  Target Date: 6 months  Person/Persons Responsible for Completion of Goal: Patient    Progress Towards Goals: Stable    Treatment Modality: Medication management every two months    Review due in 180 days from the date of this plan    Expected length of service: Follow up appointment every 1-2 months for maintenance    My Physician/PA/NP and I have developed this plan  together and I agree to work on the goals and objectives. I understand the treatment goals that were developed for my treatment. I consent to the above treatment plan.    Follow up in 4 weeks    Start time: 525 PM    Stop time: 536 PM

## 2024-02-06 ENCOUNTER — TELEMEDICINE (OUTPATIENT)
Dept: PSYCHIATRY | Facility: CLINIC | Age: 34
End: 2024-02-06
Payer: COMMERCIAL

## 2024-02-06 DIAGNOSIS — F41.1 GAD (GENERALIZED ANXIETY DISORDER): ICD-10-CM

## 2024-02-06 DIAGNOSIS — F43.10 POST TRAUMATIC STRESS DISORDER (PTSD): ICD-10-CM

## 2024-02-06 DIAGNOSIS — G47.00 INSOMNIA, UNSPECIFIED TYPE: ICD-10-CM

## 2024-02-06 DIAGNOSIS — F33.1 MAJOR DEPRESSIVE DISORDER, RECURRENT, MODERATE (HCC): Primary | ICD-10-CM

## 2024-02-06 DIAGNOSIS — F33.1 MODERATE RECURRENT MAJOR DEPRESSION (HCC): ICD-10-CM

## 2024-02-06 DIAGNOSIS — F41.1 GENERALIZED ANXIETY DISORDER: ICD-10-CM

## 2024-02-06 PROCEDURE — 99213 OFFICE O/P EST LOW 20 MIN: CPT | Performed by: PSYCHIATRY & NEUROLOGY

## 2024-02-06 RX ORDER — FLUOXETINE HYDROCHLORIDE 40 MG/1
40 CAPSULE ORAL DAILY
Qty: 30 CAPSULE | Refills: 2 | Status: SHIPPED | OUTPATIENT
Start: 2024-02-06

## 2024-02-06 RX ORDER — TRAZODONE HYDROCHLORIDE 150 MG/1
150 TABLET ORAL
Qty: 30 TABLET | Refills: 2 | Status: SHIPPED | OUTPATIENT
Start: 2024-02-06

## 2024-02-06 RX ORDER — GABAPENTIN 400 MG/1
400 CAPSULE ORAL 3 TIMES DAILY
Qty: 90 CAPSULE | Refills: 2 | Status: SHIPPED | OUTPATIENT
Start: 2024-02-06

## 2024-02-06 RX ORDER — PRAZOSIN HYDROCHLORIDE 1 MG/1
1 CAPSULE ORAL
Qty: 30 CAPSULE | Refills: 2 | Status: SHIPPED | OUTPATIENT
Start: 2024-02-06

## 2024-02-06 RX ORDER — MIRTAZAPINE 15 MG/1
7.5 TABLET, FILM COATED ORAL
Qty: 15 TABLET | Refills: 3 | Status: SHIPPED | OUTPATIENT
Start: 2024-02-06

## 2024-02-06 RX ORDER — HYDROXYZINE 50 MG/1
50 TABLET, FILM COATED ORAL 3 TIMES DAILY PRN
Qty: 90 TABLET | Refills: 2 | Status: SHIPPED | OUTPATIENT
Start: 2024-02-06

## 2024-02-06 NOTE — PSYCH
After connecting through [email or web address], patient was verified with two unique identifiers.  Patient (or authorized legal representative) was then informed that this was a Telemedicine visit and that the exam was being conducted confidentially over secure lines. My office door was closed.  No one else was in the room with me.  Patient acknowledged consent and understanding of privacy and security of the Telemedicine visit, and gave permission to have a telemedicine presenter stay in the room in order to assist with the history and to conduct the exam as needed.  I informed the patient that I have reviewed their record in Epic and presented the opportunity for them to ask any questions regarding the visit today.  The patient agreed to participate.    Chief Complaint:    Depression and anxiety    Interval Summary    Pt said he is not doing good since his last visit. He said that he has been more depressed and more tearful in the last few weeks. He said he is more anxious and is worrying all the time and difficult to control his worry and feels overwhelmed most of the time. He is stressed about his living situation and finances. He said his work is stressful and he is able to work about 50 hours a week and this helps him make more money.    Psychiatric ROS    Sleep: Disturbed later in the night, interrupted 8 hours, feels tired during the day, sleeps first few hours  Appetite: Good  Suicidal ideation: Denied  Homicidal ideation: Denied  Psychosis: None reported  Chandni: None reported  PTSD: None reported, vivid dreams+  Panic attacks: None reported, overwhelming anxiety at times  Memory issues: None reported  Side effects: None reported    D+A Use    Alcohol: Sober since 12/4/2020  Nicotine: Vapes nicotine, 6 mg cartridge last 3 days  Other substances: None    Medical Issues  Acute medical issues at present: None  Last PCP Visit: 1 year ago, no issues    Medical Review of Systems    No fever/dizziness/blurred  "vision/cough/chest pain/shortness of breath/abdominal pain/nausea/vomiting/bladder or bowel problems/headache/weakness/rigidity or pain reported. Any symptoms reported by patient are listed in the interval summary.    Mental Status Exam    Motor: Normal gait and no abnormal movements  General appearance and behavior: Moderately kempt, calm, cooperative, pleasant, good eye contact, good rapport, no psychomotor abnormalities noted  Speech: Spontaneous with normal rate, normal volume and normal tone  Mood: \"depressed and anxious\"  Affect: Depressed, congruent with mood, normal range, appropriate  Thought Process: linear  Thought content: Denied suicidal or homicidal ideation. No delusions elicited  Perception: Denied any auditory or visual hallucinations  Insight: Good  Judgment: Good  Orientation: Oriented to person, place and time  Attention/Concentration: Good  Memory - Grossly intact  Language - Fluent  Fund of Knowledge - Adequate    Assessment and Plan    Major depressive disorder, recurrent, moderate - Stop duloxetine    -  Increase fluoxetine 40 mg Po daily with breakfast.    Generalized anxiety disorder - Continue hydroxyzine 50 mg PO TID PRN anxiety. Taking all 3 a day in the last few weeks.    - Continue gabapentin 400 mg PO TID    Posttraumatic stress disorder - Continue Prazosin 1 mg PO HS for nightmares and tolerating well    Insomnia unspecified - Continue trazodone 150 mg PO HS. This helps him sleep    - Continue mirtazapine 7.5 mg PO HS    D/w pt about the risk of serotonin syndrome and pt said he is aware of the symptoms and advised him to stop his antidperessants and go to the ER is any of these symptoms occur - agitation, tremor, rigidity, confusion.     Advised to request for therapy and to added [facility] the [facility]. He has not heard back yet. He has not found therapists doing after hour appts and he cannot take off from work during the day.    The clinical diagnosis, course and prognosis " were explained to the patient. Discussed with patient the clinical indications, interactions, benefits, the most common and serious side effects of all current medications. The alternative treatment options were discussed. The importance of continuing in psychotherapy was reinstated. The patient was receptive and appeared to understand the information provided. Patient's concerns and questions were addressed to patient's satisfaction during the appointment, and the patient agreed to the treatment plan.    Patient is aware of adverse effects of use of alcohol and/or other substances on mental illness and is aware of the risk of combining alcohol and/or substances with the medications that are currently prescribed. The patient was advised go to the nearest emergency room or call 911 if new symptoms arise or existing symptoms worsen or has thoughts of hurting self or others.    Follow up in 4 weeks    Behavioral Health OP Treatment Plan      Name and Date of Birth:  Misael Grace 33 y.o. 1990    Date of Treatment Plan: February 6, 2024    Diagnosis/Diagnoses:     1. Major depressive disorder, recurrent, moderate (HCC)    2. Generalized anxiety disorder    3. Post traumatic stress disorder (PTSD)    4. Insomnia, unspecified type          Strengths/Personal Resources for Self-Care: Good insight, compliant with treatment, good family support    Area/Areas of need (in own words):    1. Long Term Goal: Management of symptoms  Target Date: 6 months  Person/Persons responsible for completion of goal: Patient    2.  Short Term Objective(s):  How will we reach this goal - Continue medications as prescribed  Target Date: 6 months  Person/Persons Responsible for Completion of Goal: Patient    Progress Towards Goals: Stable    Treatment Modality: Medication management every two months    Review due in 180 days from the date of this plan    Expected length of service: Follow up appointment every 1-2 months for  maintenance    My Physician/PA/NP and I have developed this plan together and I agree to work on the goals and objectives. I understand the treatment goals that were developed for my treatment. I consent to the above treatment plan.    Follow up in 4 weeks    Start time: 540 PM    Stop time: 553 PM

## 2024-02-21 PROBLEM — Z13.6 SCREENING FOR CARDIOVASCULAR CONDITION: Status: RESOLVED | Noted: 2019-12-05 | Resolved: 2024-02-21

## 2024-03-07 ENCOUNTER — TELEMEDICINE (OUTPATIENT)
Dept: PSYCHIATRY | Facility: CLINIC | Age: 34
End: 2024-03-07
Payer: COMMERCIAL

## 2024-03-07 DIAGNOSIS — F41.1 GENERALIZED ANXIETY DISORDER: ICD-10-CM

## 2024-03-07 DIAGNOSIS — F43.10 POST TRAUMATIC STRESS DISORDER (PTSD): ICD-10-CM

## 2024-03-07 DIAGNOSIS — F33.1 MAJOR DEPRESSIVE DISORDER, RECURRENT, MODERATE (HCC): Primary | ICD-10-CM

## 2024-03-07 DIAGNOSIS — G47.00 INSOMNIA, UNSPECIFIED TYPE: ICD-10-CM

## 2024-03-07 PROCEDURE — 99214 OFFICE O/P EST MOD 30 MIN: CPT | Performed by: PSYCHIATRY & NEUROLOGY

## 2024-03-07 RX ORDER — BUPROPION HYDROCHLORIDE 75 MG/1
75 TABLET ORAL 2 TIMES DAILY
Qty: 30 TABLET | Refills: 2 | Status: SHIPPED | OUTPATIENT
Start: 2024-03-07

## 2024-03-07 NOTE — PSYCH
After connecting through [employer], patient was verified with two unique identifiers.  Patient (or authorized legal representative) was then informed that this was a Telemedicine visit and that the exam was being conducted confidentially over secure lines. My office door was closed.  No one else was in the room with me.  Patient acknowledged consent and understanding of privacy and security of the Telemedicine visit, and gave permission to have a telemedicine presenter stay in the room in order to assist with the history and to conduct the exam as needed.  I informed the patient that I have reviewed their record in Epic and presented the opportunity for them to ask any questions regarding the visit today.  The patient agreed to participate.    Chief Complaint:    Depression and anxiety    Interval Summary    Pt said he is not doing good since his last visit. He said that he is not doing well on fluoxetine and he is irritable and angry all the time. He said he gets more depressed and tearful at times. He said that its not helping him and wants to be on a different medication. He said that he continues to be depressed and anxious most days, feeling overwhelmed and it is affecting his functioning. He said that he is struggling with depression more at this time and he continues to worry about his life situation. He said that he is unable to find therapist who work after hours and he is not able to take off work during the day.    Psychiatric ROS    Sleep: Disturbed, difficulty staying asleep, 8 hours interrupted, feels tired during the day  Appetite: Good  Suicidal ideation: Denied  Homicidal ideation: Denied  Psychosis: None reported  Chandni: None reported  PTSD: None reported, vivid dreams+  Panic attacks: None reported, overwhelming anxiety at times  Memory issues: None reported  Side effects: None reported    D+A Use    Alcohol: Sober since 12/4/2020  Nicotine: Vapes nicotine, 6 mg cartridge last 3 days  Other  "substances: None    Medical Issues  Acute medical issues at present: None  Last PCP Visit: more than a year ago, no issues    Medical Review of Systems    No fever/dizziness/blurred vision/cough/chest pain/shortness of breath/abdominal pain/nausea/vomiting/bladder or bowel problems/headache/weakness/rigidity or pain reported. Any symptoms reported by patient are listed in the interval summary.    Mental Status Exam    Motor: Normal gait and no abnormal movements  General appearance and behavior: Moderately kempt, calm, cooperative, pleasant, good eye contact, good rapport, no psychomotor abnormalities noted  Speech: Spontaneous with normal rate, normal volume and normal tone  Mood: \"depressed and anxious\"  Affect: Depressed, congruent with mood, normal range, appropriate  Thought Process: linear  Thought content: Denied suicidal or homicidal ideation. No delusions elicited  Perception: Denied any auditory or visual hallucinations  Insight: Good  Judgment: Good  Orientation: Oriented to person, place and time  Attention/Concentration: Good  Memory - Grossly intact  Language - Fluent  Fund of Knowledge - Adequate    Assessment and Plan    Major depressive disorder, recurrent, moderate - Stop duloxetine    -  Stop fluoxetine    - Start bupropion 75 mg Po daily with breakfast. No h/o seizures. D/w pt that it could make his anxiety worse. Advised to start small dose and monitor closely.    Generalized anxiety disorder - Continue hydroxyzine 50 mg PO TID PRN anxiety. Taking all 3 a day in the last few weeks.    - Continue gabapentin 400 mg PO TID    Posttraumatic stress disorder - Continue Prazosin 1 mg PO HS for nightmares and tolerating well    Insomnia unspecified - Continue trazodone 150 mg PO HS. This helps him sleep    - Continue mirtazapine 7.5 mg PO HS    D/w pt about the risk of serotonin syndrome and pt said he is aware of the symptoms and advised him to stop his antidperessants and go to the ER is any of these " symptoms occur - agitation, tremor, rigidity, confusion.     Advised to request for therapy and to added to the waiting list at [facility]. He has not heard back yet. He has not found therapists doing after hour appts and he cannot take off from work during the day.    The clinical diagnosis, course and prognosis were explained to the patient. Discussed with patient the clinical indications, interactions, benefits, the most common and serious side effects of all current medications. The alternative treatment options were discussed. The importance of continuing in psychotherapy was reinstated. The patient was receptive and appeared to understand the information provided. Patient's concerns and questions were addressed to patient's satisfaction during the appointment, and the patient agreed to the treatment plan.    Patient is aware of adverse effects of use of alcohol and/or other substances on mental illness and is aware of the risk of combining alcohol and/or substances with the medications that are currently prescribed. The patient was advised go to the nearest emergency room or call 911 if new symptoms arise or existing symptoms worsen or has thoughts of hurting self or others.    Follow up in 4 weeks    Behavioral Health OP Treatment Plan      Name and Date of Birth:  Misael Grace 33 y.o. 1990    Date of Treatment Plan: March 7, 2024    Diagnosis/Diagnoses:     1. Major depressive disorder, recurrent, moderate (HCC)    2. Generalized anxiety disorder    3. Post traumatic stress disorder (PTSD)    4. Insomnia, unspecified type          Strengths/Personal Resources for Self-Care: Good insight, compliant with treatment, good family support    Area/Areas of need (in own words):    1. Long Term Goal: Management of symptoms  Target Date: 6 months  Person/Persons responsible for completion of goal: Patient    2.  Short Term Objective(s):  How will we reach this goal - Continue medications as prescribed  Target Date:  6 months  Person/Persons Responsible for Completion of Goal: Patient    Progress Towards Goals: Stable    Treatment Modality: Medication management every two months    Review due in 180 days from the date of this plan    Expected length of service: Follow up appointment every 1-2 months for maintenance    My Physician/PA/NP and I have developed this plan together and I agree to work on the goals and objectives. I understand the treatment goals that were developed for my treatment. I consent to the above treatment plan.    Follow up in 4 weeks    Start time: 545 PM    Stop time: 610 PM

## 2024-04-09 ENCOUNTER — TELEMEDICINE (OUTPATIENT)
Dept: PSYCHIATRY | Facility: CLINIC | Age: 34
End: 2024-04-09

## 2024-04-09 DIAGNOSIS — F33.1 MAJOR DEPRESSIVE DISORDER, RECURRENT, MODERATE (HCC): Primary | ICD-10-CM

## 2024-04-09 DIAGNOSIS — F41.1 GENERALIZED ANXIETY DISORDER: ICD-10-CM

## 2024-04-09 DIAGNOSIS — F43.10 POST TRAUMATIC STRESS DISORDER (PTSD): ICD-10-CM

## 2024-04-09 DIAGNOSIS — G47.00 INSOMNIA, UNSPECIFIED TYPE: ICD-10-CM

## 2024-04-09 RX ORDER — BUPROPION HYDROCHLORIDE 150 MG/1
150 TABLET ORAL EVERY MORNING
Qty: 30 TABLET | Refills: 2 | Status: SHIPPED | OUTPATIENT
Start: 2024-04-09

## 2024-04-09 NOTE — PSYCH
After connecting through [employer], patient was verified with two unique identifiers.  Patient (or authorized legal representative) was then informed that this was a Telemedicine visit and that the exam was being conducted confidentially over secure lines. My office door was closed.  No one else was in the room with me.  Patient acknowledged consent and understanding of privacy and security of the Telemedicine visit, and gave permission to have a telemedicine presenter stay in the room in order to assist with the history and to conduct the exam as needed.  I informed the patient that I have reviewed their record in Epic and presented the opportunity for them to ask any questions regarding the visit today.  The patient agreed to participate.    Chief Complaint:    Depression and anxiety    Interval Summary    Pt said he is doing better since his last visit. He said that he is less depressed and less hopeless, less intense and less frequent, improvement by about 50 % since bupropion was started. He said that his anxiety continues to be the same, not worsened, situational at times and able to cope with it mostly. He is still stressed about his living situation, and planning to move soon. He is able to save money and his credit score is improving.    Psychiatric ROS    Sleep: Better, 8 hours, wakes up about once and is sleeping better  Appetite: Good  Suicidal ideation: Denied  Homicidal ideation: Denied  Psychosis: None reported  Chandni: None reported  PTSD: None reported, vivid dreams+  Panic attacks: None reported  Memory issues: None reported  Side effects: None reported    D+A Use    Alcohol: Sober since 12/4/2020  Nicotine: Vapes nicotine, 6 mg cartridge last 3 days  Other substances: None    Medical Issues  Acute medical issues at present: None  Last PCP Visit: more than a year ago, no issues    Medical Review of Systems    No fever/dizziness/blurred vision/cough/chest pain/shortness of breath/abdominal  "pain/nausea/vomiting/bladder or bowel problems/headache/weakness/rigidity or pain reported. Any symptoms reported by patient are listed in the interval summary.    Mental Status Exam    Motor: Normal gait and no abnormal movements  General appearance and behavior: Moderately kempt, calm, cooperative, pleasant, good eye contact, good rapport, no psychomotor abnormalities noted  Speech: Spontaneous with normal rate, normal volume and normal tone  Mood: \"Better\"  Affect: Depressed, congruent with mood, normal range, appropriate  Thought Process: linear  Thought content: Denied suicidal or homicidal ideation. No delusions elicited  Perception: Denied any auditory or visual hallucinations  Insight: Good  Judgment: Good  Orientation: Oriented to person, place and time  Attention/Concentration: Good  Memory - Grossly intact  Language - Fluent  Fund of Knowledge - Adequate    Assessment and Plan    Major depressive disorder, recurrent, moderate -     - Increase bupropion  mg Po daily with breakfast. Tolerating 75 mg well    No h/o seizures. D/w pt that it could make his anxiety worse. Advised to start small dose and monitor closely.    Generalized anxiety disorder - Continue hydroxyzine 50 mg PO TID PRN anxiety. Taking all 3 a day in the last few weeks.    - Continue gabapentin 400 mg PO TID    Posttraumatic stress disorder - Continue Prazosin 1 mg PO HS for nightmares and tolerating well    Insomnia unspecified - Continue trazodone 150 mg PO HS. This helps him sleep    - Continue mirtazapine 7.5 mg PO HS    Advised to request for therapy and to added to the waiting list at [facility]. He has not heard back yet. He has not found therapists doing after hour appts and he cannot take off from work during the day.    The clinical diagnosis, course and prognosis were explained to the patient. Discussed with patient the clinical indications, interactions, benefits, the most common and serious side effects of all current " medications. The alternative treatment options were discussed. The importance of continuing in psychotherapy was reinstated. The patient was receptive and appeared to understand the information provided. Patient's concerns and questions were addressed to patient's satisfaction during the appointment, and the patient agreed to the treatment plan.    Patient is aware of adverse effects of use of alcohol and/or other substances on mental illness and is aware of the risk of combining alcohol and/or substances with the medications that are currently prescribed. The patient was advised go to the nearest emergency room or call 911 if new symptoms arise or existing symptoms worsen or has thoughts of hurting self or others.    Follow up in 4 weeks    Behavioral Health OP Treatment Plan      Name and Date of Birth:  Misael Grace 34 y.o. 1990    Date of Treatment Plan: April 9, 2024    Diagnosis/Diagnoses:     1. Major depressive disorder, recurrent, moderate (HCC)    2. Generalized anxiety disorder    3. Post traumatic stress disorder (PTSD)    4. Insomnia, unspecified type          Strengths/Personal Resources for Self-Care: Good insight, compliant with treatment, good family support    Area/Areas of need (in own words):    1. Long Term Goal: Management of symptoms  Target Date: 6 months  Person/Persons responsible for completion of goal: Patient    2.  Short Term Objective(s):  How will we reach this goal - Continue medications as prescribed  Target Date: 6 months  Person/Persons Responsible for Completion of Goal: Patient    Progress Towards Goals: Stable    Treatment Modality: Medication management every two months    Review due in 180 days from the date of this plan    Expected length of service: Follow up appointment every 1-2 months for maintenance    My Physician/PA/NP and I have developed this plan together and I agree to work on the goals and objectives. I understand the treatment goals that were developed  for my treatment. I consent to the above treatment plan.    Follow up in 4 weeks    Start time: 548 PM    Stop time: 558 PM

## 2024-05-07 ENCOUNTER — TELEMEDICINE (OUTPATIENT)
Dept: PSYCHIATRY | Facility: CLINIC | Age: 34
End: 2024-05-07
Payer: COMMERCIAL

## 2024-05-07 DIAGNOSIS — F41.1 GENERALIZED ANXIETY DISORDER: ICD-10-CM

## 2024-05-07 DIAGNOSIS — G47.00 INSOMNIA, UNSPECIFIED TYPE: ICD-10-CM

## 2024-05-07 DIAGNOSIS — F41.1 GAD (GENERALIZED ANXIETY DISORDER): ICD-10-CM

## 2024-05-07 DIAGNOSIS — F43.10 POST TRAUMATIC STRESS DISORDER (PTSD): ICD-10-CM

## 2024-05-07 DIAGNOSIS — F33.1 MAJOR DEPRESSIVE DISORDER, RECURRENT, MODERATE (HCC): Primary | ICD-10-CM

## 2024-05-07 DIAGNOSIS — F33.1 MODERATE RECURRENT MAJOR DEPRESSION (HCC): ICD-10-CM

## 2024-05-07 PROCEDURE — 99213 OFFICE O/P EST LOW 20 MIN: CPT | Performed by: PSYCHIATRY & NEUROLOGY

## 2024-05-07 RX ORDER — MIRTAZAPINE 15 MG/1
7.5 TABLET, FILM COATED ORAL
Qty: 15 TABLET | Refills: 3 | Status: SHIPPED | OUTPATIENT
Start: 2024-05-07

## 2024-05-07 RX ORDER — TRAZODONE HYDROCHLORIDE 150 MG/1
150 TABLET ORAL
Qty: 30 TABLET | Refills: 2 | Status: SHIPPED | OUTPATIENT
Start: 2024-05-07

## 2024-05-07 RX ORDER — GABAPENTIN 400 MG/1
400 CAPSULE ORAL 3 TIMES DAILY
Qty: 90 CAPSULE | Refills: 2 | Status: SHIPPED | OUTPATIENT
Start: 2024-05-07

## 2024-05-07 RX ORDER — PRAZOSIN HYDROCHLORIDE 1 MG/1
1 CAPSULE ORAL
Qty: 30 CAPSULE | Refills: 2 | Status: SHIPPED | OUTPATIENT
Start: 2024-05-07

## 2024-05-07 RX ORDER — HYDROXYZINE 50 MG/1
50 TABLET, FILM COATED ORAL 3 TIMES DAILY PRN
Qty: 90 TABLET | Refills: 2 | Status: SHIPPED | OUTPATIENT
Start: 2024-05-07

## 2024-05-07 RX ORDER — BUPROPION HYDROCHLORIDE 100 MG/1
150 TABLET ORAL DAILY
Qty: 45 TABLET | Refills: 3 | Status: SHIPPED | OUTPATIENT
Start: 2024-05-07

## 2024-05-07 NOTE — PSYCH
After connecting through [employer], patient was verified with two unique identifiers. Patient confirmed that they were at Home/Office and were in [place] at the time of the appointment.    Patient (or authorized legal representative) was then informed that this was a Telemedicine visit and that the exam was being conducted confidentially over secure lines. My office door was closed.  No one else was in the room with me.  Patient acknowledged consent and understanding of privacy and security of the Telemedicine visit, and gave permission to have a telemedicine presenter stay in the room in order to assist with the history and to conduct the exam as needed.  I informed the patient that I have reviewed their record in Epic and presented the opportunity for them to ask any questions regarding the visit today.  The patient agreed to participate.    Chief Complaint:    Depression and anxiety    Interval Summary    Pt said he is doing better since his last visit. He said that he is not sleeping well during the night and he wakes up about 6-7 times and does not feel rested in the morning. He said that he is able to manage his depression enough to get through the day and feels it has been worse since he has not been sleeping. He continues to feel anxious with not much improvement. He is stressed about finances and living situation.    Psychiatric ROS    Sleep: Interrupted, 6-7 hours, does not feel rested  Appetite: Good  Suicidal ideation: Denied  Homicidal ideation: Denied  Psychosis: None reported  Chandni: None reported  PTSD: None reported, vivid dreams+  Panic attacks: None reported  Memory issues: None reported  Side effects: None reported    D+A Use    Alcohol: Sober since 12/4/2020  Nicotine: Vapes nicotine, 6 mg cartridge last 3 days  Other substances: None    Medical Issues  Acute medical issues at present: None  Last PCP Visit: more than a year ago, no issues    Medical Review of Systems    No  "fever/dizziness/blurred vision/cough/chest pain/shortness of breath/abdominal pain/nausea/vomiting/bladder or bowel problems/headache/weakness/rigidity or pain reported. Any symptoms reported by patient are listed in the interval summary.    Mental Status Exam    Motor: Normal gait and no abnormal movements  General appearance and behavior: Moderately kempt, calm, cooperative, pleasant, good eye contact, good rapport, no psychomotor abnormalities noted  Speech: Spontaneous with normal rate, normal volume and normal tone  Mood: \"tired\"  Affect: Depressed, congruent with mood, normal range, appropriate  Thought Process: linear  Thought content: Denied suicidal or homicidal ideation. No delusions elicited  Perception: Denied any auditory or visual hallucinations  Insight: Good  Judgment: Good  Orientation: Oriented to person, place and time  Attention/Concentration: Good  Memory - Grossly intact  Language - Fluent  Fund of Knowledge - Adequate    Assessment and Plan    Major depressive disorder, recurrent, moderate -     - Change bupropion XL to buporpion 150 mg PO daily with breakfast. The XL prep may be causing sleep issues.    No h/o seizures. D/w pt that it could make his anxiety worse. Advised to start small dose and monitor closely.    Generalized anxiety disorder - Continue hydroxyzine 50 mg PO TID PRN anxiety. Taking all 3 a day in the last few weeks.    - Continue gabapentin 400 mg PO TID    Posttraumatic stress disorder - Continue Prazosin 1 mg PO HS for nightmares and tolerating well    Insomnia unspecified - Continue trazodone 150 mg PO HS. This helps him sleep    - Continue mirtazapine 7.5 mg PO HS    Advised to request for therapy and to added to the waiting list at [facility]. He has not heard back yet. He has not found therapists doing after hour appts and he cannot take off from work during the day.    The clinical diagnosis, course and prognosis were explained to the patient. Discussed with patient the " clinical indications, interactions, benefits, the most common and serious side effects of all current medications. The alternative treatment options were discussed. The importance of continuing in psychotherapy was reinstated. The patient was receptive and appeared to understand the information provided. Patient's concerns and questions were addressed to patient's satisfaction during the appointment, and the patient agreed to the treatment plan.    Patient is aware of adverse effects of use of alcohol and/or other substances on mental illness and is aware of the risk of combining alcohol and/or substances with the medications that are currently prescribed. The patient was advised go to the nearest emergency room or call 911 if new symptoms arise or existing symptoms worsen or has thoughts of hurting self or others.    Follow up in 4 weeks    Behavioral Health OP Treatment Plan      Name and Date of Birth:  Misael Grace 34 y.o. 1990    Date of Treatment Plan: May 7, 2024    Diagnosis/Diagnoses:     1. Major depressive disorder, recurrent, moderate (HCC)    2. Generalized anxiety disorder    3. Post traumatic stress disorder (PTSD)    4. Insomnia, unspecified type          Strengths/Personal Resources for Self-Care: Good insight, compliant with treatment, good family support    Area/Areas of need (in own words):    1. Long Term Goal: Management of symptoms  Target Date: 6 months  Person/Persons responsible for completion of goal: Patient    2.  Short Term Objective(s):  How will we reach this goal - Continue medications as prescribed  Target Date: 6 months  Person/Persons Responsible for Completion of Goal: Patient    Progress Towards Goals: Stable    Treatment Modality: Medication management every two months    Review due in 180 days from the date of this plan    Expected length of service: Follow up appointment every 1-2 months for maintenance    My Physician/PA/NP and I have developed this plan together and  I agree to work on the goals and objectives. I understand the treatment goals that were developed for my treatment. I consent to the above treatment plan.    Follow up in 4 weeks    Start time: 520 PM    Stop time: 538 PM

## 2024-06-04 ENCOUNTER — TELEMEDICINE (OUTPATIENT)
Dept: PSYCHIATRY | Facility: CLINIC | Age: 34
End: 2024-06-04
Payer: COMMERCIAL

## 2024-06-04 DIAGNOSIS — F33.1 MAJOR DEPRESSIVE DISORDER, RECURRENT, MODERATE (HCC): Primary | ICD-10-CM

## 2024-06-04 DIAGNOSIS — F41.1 GENERALIZED ANXIETY DISORDER: ICD-10-CM

## 2024-06-04 DIAGNOSIS — F43.10 POST TRAUMATIC STRESS DISORDER (PTSD): ICD-10-CM

## 2024-06-04 DIAGNOSIS — G47.00 INSOMNIA, UNSPECIFIED TYPE: ICD-10-CM

## 2024-06-04 PROCEDURE — 99213 OFFICE O/P EST LOW 20 MIN: CPT | Performed by: PSYCHIATRY & NEUROLOGY

## 2024-06-04 RX ORDER — BUSPIRONE HYDROCHLORIDE 5 MG/1
5 TABLET ORAL
Qty: 90 TABLET | Refills: 2 | Status: SHIPPED | OUTPATIENT
Start: 2024-06-04

## 2024-06-04 NOTE — PSYCH
After connecting through [employer], patient was verified with two unique identifiers. Patient confirmed that they were at Home/Office and were in [place] at the time of the appointment.    Patient (or authorized legal representative) was then informed that this was a Telemedicine visit and that the exam was being conducted confidentially over secure lines. My office door was closed.  No one else was in the room with me.  Patient acknowledged consent and understanding of privacy and security of the Telemedicine visit, and gave permission to have a telemedicine presenter stay in the room in order to assist with the history and to conduct the exam as needed.  I informed the patient that I have reviewed their record in Epic and presented the opportunity for them to ask any questions regarding the visit today.  The patient agreed to participate.    Chief Complaint:    Depression and anxiety    Interval Summary    Pt said he is doing fine since his last visit. He said he continues to be depressed most days, on and off during the day and has been able to deal with it better since being on Wellbutrin. He feels more anxious and it happens with no specific triggers at times. He continues to be stressed about his living situation and his finances. He sad he is on the waiting for therapy at [facility] for evening sessions.     Psychiatric ROS    Sleep: Interrupted, 5-6 hours, does not feel rested  Appetite: Good  Suicidal ideation: Denied  Homicidal ideation: Denied  Psychosis: None reported  Chandni: None reported  PTSD: None reported  Panic attacks: None reported  Memory issues: None reported  Side effects: None reported    D+A Use    Alcohol: Sober since 12/4/2020  Nicotine: Vapes nicotine, 6 mg cartridge last 3 days  Other substances: None    Medical Issues  Acute medical issues at present: None  Last PCP Visit: More than a year ago, no issues    Medical Review of Systems    No fever/dizziness/blurred  "vision/cough/chest pain/shortness of breath/abdominal pain/nausea/vomiting/bladder or bowel problems/headache/weakness/rigidity or pain reported. Any symptoms reported by patient are listed in the interval summary.    Mental Status Exam    Motor: Normal gait and no abnormal movements  General appearance and behavior: Moderately kempt, calm, cooperative, pleasant, good eye contact, good rapport, no psychomotor abnormalities noted  Speech: Spontaneous with normal rate, normal volume and normal tone  Mood: \"Anxious\"  Affect: Depressed, congruent with mood, normal range, appropriate  Thought Process: linear  Thought content: Denied suicidal or homicidal ideation. No delusions elicited  Perception: Denied any auditory or visual hallucinations  Insight: Good  Judgment: Good  Orientation: Oriented to person, place and time  Attention/Concentration: Good  Memory - Grossly intact  Language - Fluent  Fund of Knowledge - Adequate    Assessment and Plan    Major depressive disorder, recurrent, moderate - Continue bupropion  mg PO daily with breakfast.    No h/o seizures. D/w pt that it could make his anxiety worse. Advised to start small dose and monitor closely.    Generalized anxiety disorder - Continue hydroxyzine 50 mg PO TID PRN anxiety. Taking all 3 a day in the last few weeks.    - Continue gabapentin 400 mg PO TID    - Start buspirone 5 mg PO TID with meals    Posttraumatic stress disorder - Continue Prazosin 1 mg PO HS for nightmares and tolerating well    Insomnia unspecified - Continue trazodone 150 mg PO HS. This helps him sleep    - Continue mirtazapine 7.5 mg PO HS    Advised to request for therapy and to added to the waiting list at [facility]. He has not heard back yet. He has not found therapists doing after hour appts and he cannot take off from work during the day.    The clinical diagnosis, course and prognosis were explained to the patient. Discussed with patient the clinical indications, interactions, " benefits, the most common and serious side effects of all current medications. The alternative treatment options were discussed. The importance of continuing in psychotherapy was reinstated. The patient was receptive and appeared to understand the information provided. Patient's concerns and questions were addressed to patient's satisfaction during the appointment, and the patient agreed to the treatment plan.    Patient is aware of adverse effects of use of alcohol and/or other substances on mental illness and is aware of the risk of combining alcohol and/or substances with the medications that are currently prescribed. The patient was advised go to the nearest emergency room or call 911 if new symptoms arise or existing symptoms worsen or has thoughts of hurting self or others.    Follow up in 4 weeks    Behavioral Health OP Treatment Plan      Name and Date of Birth:  Misael Grace 34 y.o. 1990    Date of Treatment Plan: June 4, 2024    Diagnosis/Diagnoses:     1. Major depressive disorder, recurrent, moderate (HCC)    2. Generalized anxiety disorder    3. Post traumatic stress disorder (PTSD)    4. Insomnia, unspecified type          Strengths/Personal Resources for Self-Care: Good insight, compliant with treatment, good family support    Area/Areas of need (in own words):    1. Long Term Goal: Management of symptoms  Target Date: 6 months  Person/Persons responsible for completion of goal: Patient    2.  Short Term Objective(s):  How will we reach this goal - Continue medications as prescribed  Target Date: 6 months  Person/Persons Responsible for Completion of Goal: Patient    Progress Towards Goals: Stable    Treatment Modality: Medication management every two months    Review due in 180 days from the date of this plan    Expected length of service: Follow up appointment every 1-2 months for maintenance    My Physician/PA/NP and I have developed this plan together and I agree to work on the goals and  objectives. I understand the treatment goals that were developed for my treatment. I consent to the above treatment plan.    Follow up in 4 weeks    Start time: 500 PM    Stop time: 516 PM

## 2024-07-02 ENCOUNTER — TELEMEDICINE (OUTPATIENT)
Dept: PSYCHIATRY | Facility: CLINIC | Age: 34
End: 2024-07-02
Payer: COMMERCIAL

## 2024-07-02 DIAGNOSIS — F33.1 MAJOR DEPRESSIVE DISORDER, RECURRENT, MODERATE (HCC): Primary | ICD-10-CM

## 2024-07-02 DIAGNOSIS — F41.1 GENERALIZED ANXIETY DISORDER: ICD-10-CM

## 2024-07-02 DIAGNOSIS — F41.1 GAD (GENERALIZED ANXIETY DISORDER): ICD-10-CM

## 2024-07-02 DIAGNOSIS — F43.10 POST TRAUMATIC STRESS DISORDER (PTSD): ICD-10-CM

## 2024-07-02 DIAGNOSIS — G47.00 INSOMNIA, UNSPECIFIED TYPE: ICD-10-CM

## 2024-07-02 DIAGNOSIS — F33.1 MODERATE RECURRENT MAJOR DEPRESSION (HCC): ICD-10-CM

## 2024-07-02 PROCEDURE — 99213 OFFICE O/P EST LOW 20 MIN: CPT | Performed by: PSYCHIATRY & NEUROLOGY

## 2024-07-02 RX ORDER — TRAZODONE HYDROCHLORIDE 100 MG/1
200 TABLET ORAL
Qty: 60 TABLET | Refills: 2 | Status: SHIPPED | OUTPATIENT
Start: 2024-07-02

## 2024-07-02 RX ORDER — BUSPIRONE HYDROCHLORIDE 10 MG/1
10 TABLET ORAL
Qty: 90 TABLET | Refills: 2 | Status: SHIPPED | OUTPATIENT
Start: 2024-07-02

## 2024-07-02 RX ORDER — ARIPIPRAZOLE 5 MG/1
5 TABLET ORAL DAILY
Qty: 30 TABLET | Refills: 3 | Status: SHIPPED | OUTPATIENT
Start: 2024-07-02

## 2024-07-02 RX ORDER — PRAZOSIN HYDROCHLORIDE 1 MG/1
1 CAPSULE ORAL
Qty: 30 CAPSULE | Refills: 2 | Status: SHIPPED | OUTPATIENT
Start: 2024-07-02

## 2024-07-02 NOTE — PSYCH
After connecting through [employer], patient was verified with two unique identifiers. Patient confirmed that they were at Home/Office and were in [place] at the time of the appointment.    Patient (or authorized legal representative) was then informed that this was a Telemedicine visit and that the exam was being conducted confidentially over secure lines. My office door was closed.  No one else was in the room with me.  Patient acknowledged consent and understanding of privacy and security of the Telemedicine visit, and gave permission to have a telemedicine presenter stay in the room in order to assist with the history and to conduct the exam as needed.  I informed the patient that I have reviewed their record in Epic and presented the opportunity for them to ask any questions regarding the visit today.  The patient agreed to participate.    Chief Complaint:    Depression and anxiety    Interval Summary    Pt said he is not doing well since his last visit. He said he continues to feel depressed most days, on and off during the day and feels overwhelmed at times. He continues to feel pessimistic and has negative thoughts. He continues to be anxious, most days, all day long and the meds do not seem to be helping. He continues to be stressed by his living situation and finances.     Psychiatric ROS    Sleep: Interrupted, 7 hours, does not feel rested, wakes up 4-5 times  Appetite: Good  Suicidal ideation: Denied  Homicidal ideation: Denied  Psychosis: None reported  Chandni: None reported  PTSD: Nightmares, 2-3 times a week  Panic attacks: None reported  Memory issues: None reported  Side effects: None reported    D+A Use    Alcohol: Sober since 12/4/2020  Nicotine: Vapes nicotine, 6 mg cartridge last 3 days  Other substances: None    Medical Issues  Acute medical issues at present: None  Last PCP Visit: More than a year ago, no issues    Medical Review of Systems    No fever/dizziness/blurred  "vision/cough/chest pain/shortness of breath/abdominal pain/nausea/vomiting/bladder or bowel problems/headache/weakness/rigidity or pain reported. Any symptoms reported by patient are listed in the interval summary.    Mental Status Exam    Motor: Normal gait and no abnormal movements  General appearance and behavior: Moderately kempt, calm, cooperative, pleasant, good eye contact, good rapport, no psychomotor abnormalities noted  Speech: Spontaneous with normal rate, normal volume and normal tone  Mood: \"Anxious and depressed\"  Affect: Depressed, congruent with mood, normal range, appropriate  Thought Process: linear  Thought content: Denied suicidal or homicidal ideation. No delusions elicited  Perception: Denied any auditory or visual hallucinations  Insight: Good  Judgment: Good  Orientation: Oriented to person, place and time  Attention/Concentration: Good  Memory - Grossly intact  Language - Fluent  Fund of Knowledge - Adequate    Assessment and Plan    Major depressive disorder, recurrent, moderate - Stop bupropion as it is not helping him and he is becoming restless. He has not done well on antidepressants.    Start aripiprazole 5 mg PO daily with breakfast.    No h/o seizures. D/w pt that it could make his anxiety worse. Advised to start small dose and monitor closely.    Generalized anxiety disorder - Continue hydroxyzine 50 mg PO TID PRN anxiety. Taking all 3 a day in the last few weeks.    - Continue gabapentin 400 mg PO TID    - Increase buspirone 10 mg PO TID with meals    Posttraumatic stress disorder - Continue Prazosin 1 mg PO HS for nightmares and tolerating well    Insomnia unspecified - Increase trazodone 200 mg PO HS. This helps him sleep. Tolerating 150 mg well    - Stop mirtazapine.    Advised to request for therapy and to added to the waiting list at [facility]. He has not heard back yet. He has not found therapists doing after hour appts and he cannot take off from work during the day.    The " clinical diagnosis, course and prognosis were explained to the patient. Discussed with patient the clinical indications, interactions, benefits, the most common and serious side effects of all current medications. The alternative treatment options were discussed. The importance of continuing in psychotherapy was reinstated. The patient was receptive and appeared to understand the information provided. Patient's concerns and questions were addressed to patient's satisfaction during the appointment, and the patient agreed to the treatment plan.    Patient is aware of adverse effects of use of alcohol and/or other substances on mental illness and is aware of the risk of combining alcohol and/or substances with the medications that are currently prescribed. The patient was advised go to the nearest emergency room or call 911 if new symptoms arise or existing symptoms worsen or has thoughts of hurting self or others.    Follow up in 4 weeks    Behavioral Health OP Treatment Plan      Name and Date of Birth:  Msiael Grace 34 y.o. 1990    Date of Treatment Plan: July 2, 2024    Diagnosis/Diagnoses:     1. Major depressive disorder, recurrent, moderate (HCC)    2. Generalized anxiety disorder    3. Post traumatic stress disorder (PTSD)    4. Insomnia, unspecified type          Strengths/Personal Resources for Self-Care: Good insight, compliant with treatment, good family support    Area/Areas of need (in own words):    1. Long Term Goal: Management of symptoms  Target Date: 6 months  Person/Persons responsible for completion of goal: Patient    2.  Short Term Objective(s):  How will we reach this goal - Continue medications as prescribed  Target Date: 6 months  Person/Persons Responsible for Completion of Goal: Patient    Progress Towards Goals: Stable    Treatment Modality: Medication management every two months    Review due in 180 days from the date of this plan    Expected length of service: Follow up  appointment every 1-2 months for maintenance    My Physician/PA/NP and I have developed this plan together and I agree to work on the goals and objectives. I understand the treatment goals that were developed for my treatment. I consent to the above treatment plan.    Follow up in 4 weeks    Start time: 540 PM    Stop time: 608 PM

## 2024-08-02 ENCOUNTER — TELEMEDICINE (OUTPATIENT)
Dept: PSYCHIATRY | Facility: CLINIC | Age: 34
End: 2024-08-02
Payer: COMMERCIAL

## 2024-08-02 DIAGNOSIS — F43.10 POST TRAUMATIC STRESS DISORDER (PTSD): ICD-10-CM

## 2024-08-02 DIAGNOSIS — F41.1 GENERALIZED ANXIETY DISORDER: ICD-10-CM

## 2024-08-02 DIAGNOSIS — F33.1 MAJOR DEPRESSIVE DISORDER, RECURRENT, MODERATE (HCC): Primary | ICD-10-CM

## 2024-08-02 DIAGNOSIS — G47.00 INSOMNIA, UNSPECIFIED TYPE: ICD-10-CM

## 2024-08-02 PROCEDURE — 99213 OFFICE O/P EST LOW 20 MIN: CPT | Performed by: PSYCHIATRY & NEUROLOGY

## 2024-08-02 NOTE — PSYCH
After connecting through [employer], patient was verified with two unique identifiers. Patient confirmed that they were at Home/Office and were in [place] at the time of the appointment.    Patient (or authorized legal representative) was then informed that this was a Telemedicine visit and that the exam was being conducted confidentially over secure lines. My office door was closed.  No one else was in the room with me.  Patient acknowledged consent and understanding of privacy and security of the Telemedicine visit, and gave permission to have a telemedicine presenter stay in the room in order to assist with the history and to conduct the exam as needed.  I informed the patient that I have reviewed their record in Epic and presented the opportunity for them to ask any questions regarding the visit today.  The patient agreed to participate.    Chief Complaint:    Depression and anxiety    Interval Summary    Pt said that he is oding slightly better since his last visit after strating [employer]. He said that he feels depressed and anxious daily, but it has been less intense and able to cope with his mood better. He continues to be stressed about his finances and living situation.    Psychiatric ROS    Sleep: Interrupted, 7 hours, does not feel rested, wakes up few times  Appetite: Good  Suicidal ideation: Denied  Homicidal ideation: Denied  Psychosis: None reported  Chandni: None reported  PTSD: Nightmares, 2-3 times a week  Panic attacks: None reported  Memory issues: None reported  Med compliance: Good  Side effects: None reported    D+A Use    Alcohol: Sober since 12/4/2020  Nicotine: Vapes nicotine, 6 mg cartridge last 3 days  Other substances: None    Medical Issues  Acute medical issues at present: None  Last PCP Visit: More than a year ago, no issues    Medical Review of Systems    No fever/dizziness/blurred vision/cough/chest pain/shortness of breath/abdominal pain/nausea/vomiting/bladder or bowel  "problems/headache/weakness/rigidity or pain reported. Any symptoms reported by patient are listed in the interval summary.    Mental Status Exam    Motor: Normal gait and no abnormal movements  General appearance and behavior: Moderately kempt, calm, cooperative, pleasant, good eye contact, good rapport, no psychomotor abnormalities noted  Speech: Spontaneous with normal rate, normal volume and normal tone  Mood: \"Better\"  Affect: Depressed, congruent with mood, normal range, appropriate  Thought Process: linear  Thought content: Denied suicidal or homicidal ideation. No delusions elicited  Perception: Denied any auditory or visual hallucinations  Insight: Good  Judgment: Good  Orientation: Oriented to person, place and time  Attention/Concentration: Good  Memory - Grossly intact  Language - Fluent  Fund of Knowledge - Adequate    Assessment and Plan    Major depressive disorder, recurrent, moderate    - Increase aripiprazole to 7.5 mg PO daily with breakfast.    Generalized anxiety disorder - Continue hydroxyzine 50 mg PO TID PRN anxiety. Taking all 3 a day in the last few weeks.    - Continue gabapentin 400 mg PO TID    - Continue buspirone 10 mg PO TID with meals    Posttraumatic stress disorder - Continue Prazosin 1 mg PO HS for nightmares and tolerating well    Insomnia unspecified - Continue trazodone 200 mg PO HS. This helps him sleep. Tolerating 200 mg well    Advised to request for therapy and to added to the waiting list at [facility]. He has not heard back yet. He has not found therapists doing after hour appts and he cannot take off from work during the day.    The clinical diagnosis, course and prognosis were explained to the patient. Discussed with patient the clinical indications, interactions, benefits, the most common and serious side effects of all current medications. The alternative treatment options were discussed. The importance of continuing in psychotherapy was reinstated. The patient was receptive " and appeared to understand the information provided. Patient's concerns and questions were addressed to patient's satisfaction during the appointment, and the patient agreed to the treatment plan.    Patient is aware of adverse effects of use of alcohol and/or other substances on mental illness and is aware of the risk of combining alcohol and/or substances with the medications that are currently prescribed. The patient was advised go to the nearest emergency room or call 911 if new symptoms arise or existing symptoms worsen or has thoughts of hurting self or others.    Follow up in 4 weeks    Behavioral Health OP Treatment Plan      Name and Date of Birth:  Misael Grace 34 y.o. 1990    Date of Treatment Plan: August 2, 2024    Diagnosis/Diagnoses:     1. Major depressive disorder, recurrent, moderate (HCC)    2. Generalized anxiety disorder    3. Post traumatic stress disorder (PTSD)    4. Insomnia, unspecified type          Strengths/Personal Resources for Self-Care: Good insight, compliant with treatment, good family support    Area/Areas of need (in own words):    1. Long Term Goal: Management of symptoms  Target Date: 6 months  Person/Persons responsible for completion of goal: Patient    2.  Short Term Objective(s):  How will we reach this goal - Continue medications as prescribed  Target Date: 6 months  Person/Persons Responsible for Completion of Goal: Patient    Progress Towards Goals: Stable    Treatment Modality: Medication management every two months    Review due in 180 days from the date of this plan    Expected length of service: Follow up appointment every 1-2 months for maintenance    My Physician/PA/NP and I have developed this plan together and I agree to work on the goals and objectives. I understand the treatment goals that were developed for my treatment. I consent to the above treatment plan.    Follow up in 4 weeks    Start time: 540 PM    Stop time: 600 PM

## 2024-08-06 RX ORDER — ARIPIPRAZOLE 5 MG/1
7.5 TABLET ORAL DAILY
Qty: 45 TABLET | Refills: 3 | Status: SHIPPED | OUTPATIENT
Start: 2024-08-06

## 2024-08-29 ENCOUNTER — TELEMEDICINE (OUTPATIENT)
Dept: PSYCHIATRY | Facility: CLINIC | Age: 34
End: 2024-08-29
Payer: COMMERCIAL

## 2024-08-29 DIAGNOSIS — G47.00 INSOMNIA, UNSPECIFIED TYPE: ICD-10-CM

## 2024-08-29 DIAGNOSIS — F41.1 GAD (GENERALIZED ANXIETY DISORDER): ICD-10-CM

## 2024-08-29 DIAGNOSIS — F43.10 POST TRAUMATIC STRESS DISORDER (PTSD): ICD-10-CM

## 2024-08-29 DIAGNOSIS — F41.1 GENERALIZED ANXIETY DISORDER: ICD-10-CM

## 2024-08-29 DIAGNOSIS — F33.1 MAJOR DEPRESSIVE DISORDER, RECURRENT, MODERATE (HCC): Primary | ICD-10-CM

## 2024-08-29 DIAGNOSIS — F33.1 MODERATE RECURRENT MAJOR DEPRESSION (HCC): ICD-10-CM

## 2024-08-29 PROCEDURE — 99213 OFFICE O/P EST LOW 20 MIN: CPT | Performed by: PSYCHIATRY & NEUROLOGY

## 2024-08-29 RX ORDER — PRAZOSIN HYDROCHLORIDE 1 MG/1
1 CAPSULE ORAL
Qty: 30 CAPSULE | Refills: 2 | Status: SHIPPED | OUTPATIENT
Start: 2024-08-29

## 2024-08-29 RX ORDER — TRAZODONE HYDROCHLORIDE 100 MG/1
200 TABLET ORAL
Qty: 60 TABLET | Refills: 2 | Status: SHIPPED | OUTPATIENT
Start: 2024-08-29

## 2024-08-29 RX ORDER — GABAPENTIN 400 MG/1
400 CAPSULE ORAL 3 TIMES DAILY
Qty: 90 CAPSULE | Refills: 2 | Status: SHIPPED | OUTPATIENT
Start: 2024-08-29

## 2024-08-29 RX ORDER — BUSPIRONE HYDROCHLORIDE 10 MG/1
10 TABLET ORAL
Qty: 90 TABLET | Refills: 2 | Status: SHIPPED | OUTPATIENT
Start: 2024-08-29

## 2024-08-29 RX ORDER — HYDROXYZINE HYDROCHLORIDE 50 MG/1
50 TABLET, FILM COATED ORAL 3 TIMES DAILY PRN
Qty: 90 TABLET | Refills: 2 | Status: SHIPPED | OUTPATIENT
Start: 2024-08-29

## 2024-08-29 RX ORDER — ARIPIPRAZOLE 10 MG/1
10 TABLET ORAL DAILY
Qty: 30 TABLET | Refills: 3 | Status: SHIPPED | OUTPATIENT
Start: 2024-08-29

## 2024-08-29 NOTE — PSYCH
After connecting through [employer], patient was verified with two unique identifiers. Patient confirmed that they were at Home/Office and were in [place] at the time of the appointment.    Patient (or authorized legal representative) was then informed that this was a Telemedicine visit and that the exam was being conducted confidentially over secure lines. My office door was closed.  No one else was in the room with me.  Patient acknowledged consent and understanding of privacy and security of the Telemedicine visit, and gave permission to have a telemedicine presenter stay in the room in order to assist with the history and to conduct the exam as needed.  I informed the patient that I have reviewed their record in Epic and presented the opportunity for them to ask any questions regarding the visit today.  The patient agreed to participate.    Chief Complaint:    Depression and anxiety    Interval Summary    Pt said that he is doing better since his last visit. He said that he feels better with his depression and less negative thoughts. Less intense and coping better. He continues to feel anxious, most days, on and off, with no specific triggers, nervous to go to work or to the store and feels overwhelmed t times with anxiety. He is stressed about his housing, and finances.    Psychiatric ROS    Sleep: Interrupted, 8 hours, wakes up few times, feels rested at times  Appetite: Good  Suicidal ideation: Denied  Homicidal ideation: Denied  Psychosis: None reported  Chandni: None reported  PTSD: No nightmares in last few weeks  Panic attacks: None reported  Memory issues: None reported  Med compliance: Good  Side effects: None reported    D+A Use    Alcohol: Sober since 12/4/2020  Nicotine: Vapes nicotine, 6 mg cartridge last 3 days  Other substances: None    Medical Issues  Acute medical issues at present: None  Last PCP Visit: More than a year ago, no issues    Medical Review of Systems    No  "fever/dizziness/blurred vision/cough/chest pain/shortness of breath/abdominal pain/nausea/vomiting/bladder or bowel problems/headache/weakness/rigidity or pain reported. Any symptoms reported by patient are listed in the interval summary.    Mental Status Exam    Motor: Normal gait and no abnormal movements  General appearance and behavior: Moderately kempt, calm, cooperative, pleasant, good eye contact, good rapport, no psychomotor abnormalities noted  Speech: Spontaneous with normal rate, normal volume and normal tone  Mood: \"Better\"  Affect: Anxious, congruent with mood, normal range, appropriate  Thought Process: linear  Thought content: Denied suicidal or homicidal ideation. No delusions elicited  Perception: Denied any auditory or visual hallucinations  Insight: Good  Judgment: Good  Orientation: Oriented to person, place and time  Attention/Concentration: Good  Memory - Grossly intact  Language - Fluent  Fund of Knowledge - Adequate    Assessment and Plan    Major depressive disorder, recurrent, moderate    - Increase aripiprazole to 10 mg PO daily with breakfast.    Generalized anxiety disorder - Continue hydroxyzine 50 mg PO TID PRN anxiety. Taking all 3 a day in the last few weeks.    - Continue gabapentin 400 mg PO TID    - Continue buspirone 10 mg PO TID with meals    Posttraumatic stress disorder - Continue Prazosin 1 mg PO HS for nightmares and tolerating well    Insomnia unspecified - Continue trazodone 200 mg PO HS. This helps him sleep. Tolerating 200 mg well    Advised to request for therapy and to added to the waiting list at [facility]. He has not heard back yet. He has not found therapists doing after hour appts and he cannot take off from work during the day.    The clinical diagnosis, course and prognosis were explained to the patient. Discussed with patient the clinical indications, interactions, benefits, the most common and serious side effects of all current medications. The alternative " treatment options were discussed. The importance of continuing in psychotherapy was reinstated. The patient was receptive and appeared to understand the information provided. Patient's concerns and questions were addressed to patient's satisfaction during the appointment, and the patient agreed to the treatment plan.    Patient is aware of adverse effects of use of alcohol and/or other substances on mental illness and is aware of the risk of combining alcohol and/or substances with the medications that are currently prescribed. The patient was advised go to the nearest emergency room or call 911 if new symptoms arise or existing symptoms worsen or has thoughts of hurting self or others.    Follow up in 4 weeks    Behavioral Health OP Treatment Plan      Name and Date of Birth:  Misael Grace 34 y.o. 1990    Date of Treatment Plan: August 29, 2024    Diagnosis/Diagnoses:     1. Major depressive disorder, recurrent, moderate (HCC)    2. Generalized anxiety disorder    3. Post traumatic stress disorder (PTSD)    4. Insomnia, unspecified type          Strengths/Personal Resources for Self-Care: Good insight, compliant with treatment, good family support    Area/Areas of need (in own words):    1. Long Term Goal: Management of symptoms  Target Date: 6 months  Person/Persons responsible for completion of goal: Patient    2.  Short Term Objective(s):  How will we reach this goal - Continue medications as prescribed  Target Date: 6 months  Person/Persons Responsible for Completion of Goal: Patient    Progress Towards Goals: Stable    Treatment Modality: Medication management every two months    Review due in 180 days from the date of this plan    Expected length of service: Follow up appointment every 1-2 months for maintenance    My Physician/PA/NP and I have developed this plan together and I agree to work on the goals and objectives. I understand the treatment goals that were developed for my treatment. I consent  to the above treatment plan.    Follow up in 4 weeks    Start time: 520 PM    Stop time: 535 PM

## 2024-08-30 ENCOUNTER — TELEPHONE (OUTPATIENT)
Dept: PSYCHIATRY | Facility: CLINIC | Age: 34
End: 2024-08-30

## 2024-08-30 NOTE — TELEPHONE ENCOUNTER
Attempted to call pt to schedule f/u appt with Dr. Bay. Unable to leave  due to voicemail box being full.    If pt calls back, please offer one of these time slots for a f/u appt with Dr. Bay:    Tuesday 9/24/24 @ 4:40pm       OR    Thursday 9/26/24 @ 5:00pm

## 2024-09-04 ENCOUNTER — TELEPHONE (OUTPATIENT)
Dept: PSYCHIATRY | Facility: CLINIC | Age: 34
End: 2024-09-04

## 2024-09-04 NOTE — TELEPHONE ENCOUNTER
Attempted to call pt to schedule f/u appt with Dr. Bay. Unable to leave  due to full voicemail box.

## 2024-09-26 ENCOUNTER — TELEMEDICINE (OUTPATIENT)
Dept: PSYCHIATRY | Facility: CLINIC | Age: 34
End: 2024-09-26
Payer: COMMERCIAL

## 2024-09-26 DIAGNOSIS — F43.10 POST TRAUMATIC STRESS DISORDER (PTSD): ICD-10-CM

## 2024-09-26 DIAGNOSIS — F33.1 MAJOR DEPRESSIVE DISORDER, RECURRENT, MODERATE (HCC): Primary | ICD-10-CM

## 2024-09-26 DIAGNOSIS — G47.00 INSOMNIA, UNSPECIFIED TYPE: ICD-10-CM

## 2024-09-26 DIAGNOSIS — F41.1 GENERALIZED ANXIETY DISORDER: ICD-10-CM

## 2024-09-26 PROCEDURE — 99213 OFFICE O/P EST LOW 20 MIN: CPT | Performed by: PSYCHIATRY & NEUROLOGY

## 2024-09-26 RX ORDER — DULOXETIN HYDROCHLORIDE 30 MG/1
30 CAPSULE, DELAYED RELEASE ORAL
Qty: 30 CAPSULE | Refills: 3 | Status: SHIPPED | OUTPATIENT
Start: 2024-09-26

## 2024-09-26 NOTE — PSYCH
After connecting through [employer], patient was verified with two unique identifiers. Patient confirmed that they were at Home/Office and were in [place] at the time of the appointment.    Patient (or authorized legal representative) was then informed that this was a Telemedicine visit and that the exam was being conducted confidentially over secure lines. My office door was closed.  No one else was in the room with me.  Patient acknowledged consent and understanding of privacy and security of the Telemedicine visit, and gave permission to have a telemedicine presenter stay in the room in order to assist with the history and to conduct the exam as needed.  I informed the patient that I have reviewed their record in Epic and presented the opportunity for them to ask any questions regarding the visit today.  The patient agreed to participate.    Chief Complaint:    Depression and anxiety    Interval Summary    Pt said that he is not doing good since his last visit. He said that he has been more depressed and anxious in the last few weeks, most days, and feels overwhelmed at times. He is stressed about his work, living situation and finances. He said that increase in Abilify did not help the mood.     Psychiatric ROS    Sleep: Interrupted, 7 hours, wakes up few times, feels tired  Appetite: Good  Suicidal ideation: Denied  Homicidal ideation: Denied  Psychosis: None reported  Chandni: None reported  PTSD: Nightmares better  Panic attacks: None reported  Memory issues: None reported  Med compliance: Good  Side effects: None reported    D+A Use    Alcohol: Sober since 12/4/2020  Nicotine: Vapes nicotine, 6 mg cartridge last 3 days  Other substances: None    Medical Issues  Acute medical issues at present: None  Last PCP Visit: More than a year ago, no issues    Medical Review of Systems    No fever/dizziness/blurred vision/cough/chest pain/shortness of breath/abdominal pain/nausea/vomiting/bladder or bowel  "problems/headache/weakness/rigidity or pain reported. Any symptoms reported by patient are listed in the interval summary.    Mental Status Exam    Motor: Normal gait and no abnormal movements  General appearance and behavior: Moderately kempt, calm, cooperative, pleasant, good eye contact, good rapport, no psychomotor abnormalities noted  Speech: Spontaneous with normal rate, normal volume and normal tone  Mood: \"Not good\"  Affect: Depressed, congruent with mood, normal range, appropriate  Thought Process: linear  Thought content: Denied suicidal or homicidal ideation. No delusions elicited  Perception: Denied any auditory or visual hallucinations  Insight: Good  Judgment: Good  Orientation: Oriented to person, place and time  Attention/Concentration: Good  Memory - Grossly intact  Language - Fluent  Fund of Knowledge - Adequate    Assessment and Plan    Major depressive disorder, recurrent, moderate    - Continue aripiprazole to 10 mg PO daily with breakfast.    - Start duloxetine 30 mg PO daily with breakfast as pt said he did netter on this medication.    Generalized anxiety disorder - Continue hydroxyzine 50 mg PO TID PRN anxiety. Taking all 3 a day in the last few weeks.    - Continue gabapentin 400 mg PO TID    - Continue buspirone 10 mg PO TID with meals    Posttraumatic stress disorder - Continue Prazosin 1 mg PO HS for nightmares and tolerating well    Insomnia unspecified - Continue trazodone 200 mg PO HS. This helps him sleep. Tolerating 200 mg well    Advised to request for therapy and to added to the waiting list at [facility]. He has not heard back yet. He has not found therapists doing after hour appts and he cannot take off from work during the day.    The clinical diagnosis, course and prognosis were explained to the patient. Discussed with patient the clinical indications, interactions, benefits, the most common and serious side effects of all current medications. The alternative treatment options were " discussed. The importance of continuing in psychotherapy was reinstated. The patient was receptive and appeared to understand the information provided. Patient's concerns and questions were addressed to patient's satisfaction during the appointment, and the patient agreed to the treatment plan.    Patient is aware of adverse effects of use of alcohol and/or other substances on mental illness and is aware of the risk of combining alcohol and/or substances with the medications that are currently prescribed. The patient was advised go to the nearest emergency room or call 911 if new symptoms arise or existing symptoms worsen or has thoughts of hurting self or others.    Follow up in 4 weeks    Behavioral Health OP Treatment Plan      Name and Date of Birth:  Misael Grace 34 y.o. 1990    Date of Treatment Plan: September 26, 2024    Diagnosis/Diagnoses:     1. Major depressive disorder, recurrent, moderate (HCC)    2. Generalized anxiety disorder    3. Post traumatic stress disorder (PTSD)    4. Insomnia, unspecified type          Strengths/Personal Resources for Self-Care: Good insight, compliant with treatment, good family support    Area/Areas of need (in own words):    1. Long Term Goal: Management of symptoms  Target Date: 6 months  Person/Persons responsible for completion of goal: Patient    2.  Short Term Objective(s):  How will we reach this goal - Continue medications as prescribed  Target Date: 6 months  Person/Persons Responsible for Completion of Goal: Patient    Progress Towards Goals: Stable    Treatment Modality: Medication management every two months    Review due in 180 days from the date of this plan    Expected length of service: Follow up appointment every 1-2 months for maintenance    My Physician/PA/NP and I have developed this plan together and I agree to work on the goals and objectives. I understand the treatment goals that were developed for my treatment. I consent to the above  treatment plan.    Follow up in 4 weeks    Start time: 500 PM    Stop time: 518 PM

## 2024-10-29 ENCOUNTER — TELEMEDICINE (OUTPATIENT)
Dept: PSYCHIATRY | Facility: CLINIC | Age: 34
End: 2024-10-29
Payer: COMMERCIAL

## 2024-10-29 DIAGNOSIS — F43.10 POST TRAUMATIC STRESS DISORDER (PTSD): ICD-10-CM

## 2024-10-29 DIAGNOSIS — G47.00 INSOMNIA, UNSPECIFIED TYPE: ICD-10-CM

## 2024-10-29 DIAGNOSIS — F33.1 MAJOR DEPRESSIVE DISORDER, RECURRENT, MODERATE (HCC): Primary | ICD-10-CM

## 2024-10-29 DIAGNOSIS — F41.1 GENERALIZED ANXIETY DISORDER: ICD-10-CM

## 2024-10-29 PROCEDURE — 99214 OFFICE O/P EST MOD 30 MIN: CPT | Performed by: PSYCHIATRY & NEUROLOGY

## 2024-10-29 NOTE — BH TREATMENT PLAN
TREATMENT PLAN (Medication Management Only)        [facility]    Name and Date of Birth:  Misael Grace 34 y.o. 1990  Date of Treatment Plan: October 29, 2024  Diagnosis/Diagnoses:    1. Major depressive disorder, recurrent, moderate (HCC)    2. Generalized anxiety disorder    3. Post traumatic stress disorder (PTSD)    4. Insomnia, unspecified type      Strengths/Personal Resources for Self-Care: supportive family, independence.  Area/Areas of need (in own words): depression  1. Long Term Goal: continue improvement in depression.  Target Date:6 months - 4/29/2025  Person/Persons responsible for completion of goal: Misael  2.  Short Term Objective (s) - How will we reach this goal?:   A. Provider new recommended medication/dosage changes and/or continue medication(s): continue current medications as prescribed.  B. N/A.  C. N/A.  Target Date:6 months - 4/29/2025  Person/Persons Responsible for Completion of Goal: Misael  Progress Towards Goals: continuing treatment  Treatment Modality: medication management every 1 months  Review due 180 days from date of this plan: 6 months - 4/29/2025  Expected length of service: ongoing treatment  My Physician/PA/NP and I have developed this plan together and I agree to work on the goals and objectives. I understand the treatment goals that were developed for my treatment.

## 2024-10-29 NOTE — PSYCH
After connecting through [employer], patient was verified with two unique identifiers. Patient confirmed that they were at Home/Office and were in [place] at the time of the appointment.    Patient (or authorized legal representative) was then informed that this was a Telemedicine visit and that the exam was being conducted confidentially over secure lines. My office door was closed.  No one else was in the room with me.  Patient acknowledged consent and understanding of privacy and security of the Telemedicine visit, and gave permission to have a telemedicine presenter stay in the room in order to assist with the history and to conduct the exam as needed.  I informed the patient that I have reviewed their record in Epic and presented the opportunity for them to ask any questions regarding the visit today.  The patient agreed to participate.    Chief Complaint:    Depression and anxiety    Interval Summary    Pt said that he is doing better since his last visit. He said that the duloxetine seems to be helping with his depression. He said that he feels less depressed, less intense, still feel low most days, but able to cope much better with it. He said that he has been less anxious as well, but still feel anxious most days. He said that he is stressed about his finance and living situation. He plans to move out soon, looking for apartments that are pet friendly to rent and also looking to buy a house if they are approved for a mortgage.     Psychiatric ROS    Sleep: Interrupted, 8 hours, wakes up few times, feels tired  Appetite: Good  Suicidal ideation: Denied  Homicidal ideation: Denied  Psychosis: None reported  Chandni: None reported  PTSD: None reported, Weird dreams at times  Panic attacks: None reported  Memory issues: None reported  Med compliance: Good  Side effects: None reported    D+A Use    Alcohol: Sober since 12/4/2020  Nicotine: Vapes nicotine, 6 mg cartridge last 3 days  Other substances:  "None    Medical Issues  Acute medical issues at present: None  Last PCP Visit: More than a year ago, no issues    Medical Review of Systems    No fever/dizziness/blurred vision/cough/chest pain/shortness of breath/abdominal pain/nausea/vomiting/bladder or bowel problems/headache/weakness/rigidity or pain reported. Any symptoms reported by patient are listed in the interval summary.    Mental Status Exam    Motor: Normal gait and no abnormal movements  General appearance and behavior: Moderately kempt, calm, cooperative, pleasant, good eye contact, good rapport, no psychomotor abnormalities noted  Speech: Spontaneous with normal rate, normal volume and normal tone  Mood: \"Better\"  Affect: Depressed, congruent with mood, normal range, appropriate  Thought Process: linear  Thought content: Denied suicidal or homicidal ideation. No delusions elicited  Perception: Denied any auditory or visual hallucinations  Insight: Good  Judgment: Good  Orientation: Oriented to person, place and time  Attention/Concentration: Good  Memory - Grossly intact  Language - Fluent  Fund of Knowledge - Adequate    Assessment and Plan    Major depressive disorder, recurrent, moderate    - Continue aripiprazole 10 mg PO daily with breakfast.    - Continue duloxetine 30 mg PO daily with breakfast.     Generalized anxiety disorder - Continue hydroxyzine 50 mg PO TID PRN anxiety. Taking all 3 a day in the last few weeks.    - Continue gabapentin 400 mg PO TID    - Continue buspirone 10 mg PO TID with meals    Posttraumatic stress disorder - Continue Prazosin 1 mg PO HS for nightmares and tolerating well    Insomnia unspecified - Continue trazodone 200 mg PO HS. This helps him sleep. Tolerating 200 mg well    Advised to request for therapy and to added to the waiting list at [facility]. He has not heard back yet. He has not found therapists doing after hour appts and he cannot take off from work during the day.    The clinical diagnosis, course and " prognosis were explained to the patient. Discussed with patient the clinical indications, interactions, benefits, the most common and serious side effects of all current medications. The alternative treatment options were discussed. The importance of continuing in psychotherapy was reinstated. The patient was receptive and appeared to understand the information provided. Patient's concerns and questions were addressed to patient's satisfaction during the appointment, and the patient agreed to the treatment plan.    Patient is aware of adverse effects of use of alcohol and/or other substances on mental illness and is aware of the risk of combining alcohol and/or substances with the medications that are currently prescribed. The patient was advised go to the nearest emergency room or call 911 if new symptoms arise or existing symptoms worsen or has thoughts of hurting self or others.    Follow up in 4 weeks    Behavioral Health OP Treatment Plan      Name and Date of Birth:  Misael Grace 34 y.o. 1990    Date of Treatment Plan: October 29, 2024    Diagnosis/Diagnoses:     1. Major depressive disorder, recurrent, moderate (HCC)    2. Generalized anxiety disorder    3. Post traumatic stress disorder (PTSD)    4. Insomnia, unspecified type          Strengths/Personal Resources for Self-Care: Good insight, compliant with treatment, good family support    Area/Areas of need (in own words):    1. Long Term Goal: Management of symptoms  Target Date: 6 months  Person/Persons responsible for completion of goal: Patient    2.  Short Term Objective(s):  How will we reach this goal - Continue medications as prescribed  Target Date: 6 months  Person/Persons Responsible for Completion of Goal: Patient    Progress Towards Goals: Stable    Treatment Modality: Medication management every two months    Review due in 180 days from the date of this plan    Expected length of service: Follow up appointment every 1-2 months for  maintenance    My Physician/PA/NP and I have developed this plan together and I agree to work on the goals and objectives. I understand the treatment goals that were developed for my treatment. I consent to the above treatment plan.    Follow up in 4 weeks    Start time: 530 PM    Stop time: 555 PM

## 2024-10-29 NOTE — BH CRISIS PLAN
Client Name: Misael Grace       Client YOB: 1990    Luzma Safety Plan      Creation Date: 10/29/24    Created By: Jose L Bay MD       Step 1: Warning Signs:   Warning Signs   Depression            Step 2: Internal Coping Strategies:   Internal Coping Strategies   Distraction   Play with pets, talk to family, plays video games            Step 3: People and social settings that provide distraction:   Name Contact Information   Wife and parents             Step 4: People whom I can ask for help during a crisis:      Name Contact Information    Wofe and parents       Step 5: Professionals or agencies I can contact during a crisis:      Clinican/Agency Name Phone Emergency Contact    [employer]          Crisis Phone Numbers:   Suicide Prevention Lifeline: Call or Text  948 Crisis Text Line: Text HOME to 052-082   Please note: Some [facility] do not have a separate number for Child/Adolescent specific crisis. If your county is not listed under Child/Adolescent, please call the adult number for your county      Adult Crisis Numbers: Child/Adolescent Crisis Numbers   [facility]: [phone] [facility]: [phone]   [facility]: [phone] [facility]: [phone]   [facility]: [phone] [place]: [phone]   [facility]: [phone] [facility]: [phone]   [facility]: [phone]   [facility]: [phone]   [facility]: [phone]   [facility]: [phone] (daytime) [phone] (after hours, weekends, holidays)      Step 6: Making the environment safer (plan for lethal means safety):      Optional: What is most important to me and worth living for?      Luzma Safety Plan. Alicia Steiner and Jose C Childs. Used with permission of the authors.

## 2024-12-05 DIAGNOSIS — F33.1 MODERATE RECURRENT MAJOR DEPRESSION (HCC): ICD-10-CM

## 2024-12-05 NOTE — TELEPHONE ENCOUNTER
Reason for call:   [x] Refill   [] Prior Auth  [] Other:     Office:   [] PCP/Provider -   [x] Specialty/Provider - psych/[facility]    Medication: Trazodone 100mg    Dose/Frequency: 2 tab hs     Quantity: 60    Pharmacy: [facility] [address] [place] [address] ROMEO GORMAN [phone]     Does the patient have enough for 3 days?   [] Yes   [x] No - no medication for tonight

## 2024-12-06 RX ORDER — TRAZODONE HYDROCHLORIDE 100 MG/1
200 TABLET ORAL
Qty: 60 TABLET | Refills: 2 | Status: SHIPPED | OUTPATIENT
Start: 2024-12-06

## 2024-12-11 DIAGNOSIS — F41.1 GAD (GENERALIZED ANXIETY DISORDER): ICD-10-CM

## 2024-12-11 NOTE — TELEPHONE ENCOUNTER
Reason for call:   [x] Refill-Patient is scheduled for appointment on 12/17 but has run out of medication. Requesting refill to hold over until appointment.   [] Prior Auth  [] Other:     Office:   [] PCP/Provider -   [x] Specialty/Provider -  [facility] / Jose L Bay MD    Medication: gabapentin (NEURONTIN) 400 mg capsule     Dose/Frequency: Take 1 capsule (400 mg total) by mouth 3 (three) times a day     Quantity: 90    Pharmacy:  [facility] [place] [id number] ROMEO GORMAN     Does the patient have enough for 3 days?   [] Yes   [x] No - Send as HP to POD

## 2024-12-12 RX ORDER — GABAPENTIN 400 MG/1
400 CAPSULE ORAL 3 TIMES DAILY
Qty: 90 CAPSULE | Refills: 0 | Status: SHIPPED | OUTPATIENT
Start: 2024-12-12 | End: 2024-12-17 | Stop reason: SDUPTHER

## 2024-12-17 ENCOUNTER — TELEMEDICINE (OUTPATIENT)
Dept: PSYCHIATRY | Facility: CLINIC | Age: 34
End: 2024-12-17
Payer: COMMERCIAL

## 2024-12-17 DIAGNOSIS — F33.1 MODERATE RECURRENT MAJOR DEPRESSION (HCC): ICD-10-CM

## 2024-12-17 DIAGNOSIS — G47.00 INSOMNIA, UNSPECIFIED TYPE: ICD-10-CM

## 2024-12-17 DIAGNOSIS — F43.10 POST TRAUMATIC STRESS DISORDER (PTSD): ICD-10-CM

## 2024-12-17 DIAGNOSIS — F41.1 GAD (GENERALIZED ANXIETY DISORDER): ICD-10-CM

## 2024-12-17 DIAGNOSIS — F33.1 MAJOR DEPRESSIVE DISORDER, RECURRENT, MODERATE (HCC): Primary | ICD-10-CM

## 2024-12-17 DIAGNOSIS — F41.1 GENERALIZED ANXIETY DISORDER: ICD-10-CM

## 2024-12-17 PROCEDURE — 99214 OFFICE O/P EST MOD 30 MIN: CPT | Performed by: PSYCHIATRY & NEUROLOGY

## 2024-12-17 RX ORDER — TRAZODONE HYDROCHLORIDE 100 MG/1
200 TABLET ORAL
Qty: 180 TABLET | Refills: 1 | Status: SHIPPED | OUTPATIENT
Start: 2024-12-17

## 2024-12-17 RX ORDER — HYDROXYZINE HYDROCHLORIDE 50 MG/1
50 TABLET, FILM COATED ORAL 2 TIMES DAILY PRN
Qty: 180 TABLET | Refills: 1 | Status: SHIPPED | OUTPATIENT
Start: 2024-12-17

## 2024-12-17 RX ORDER — BUSPIRONE HYDROCHLORIDE 10 MG/1
10 TABLET ORAL
Qty: 270 TABLET | Refills: 1 | Status: SHIPPED | OUTPATIENT
Start: 2024-12-17

## 2024-12-17 RX ORDER — ARIPIPRAZOLE 10 MG/1
10 TABLET ORAL DAILY
Qty: 90 TABLET | Refills: 1 | Status: SHIPPED | OUTPATIENT
Start: 2024-12-17

## 2024-12-17 RX ORDER — PRAZOSIN HYDROCHLORIDE 1 MG/1
1 CAPSULE ORAL
Qty: 90 CAPSULE | Refills: 1 | Status: SHIPPED | OUTPATIENT
Start: 2024-12-17

## 2024-12-17 RX ORDER — GABAPENTIN 400 MG/1
400 CAPSULE ORAL 3 TIMES DAILY
Qty: 270 CAPSULE | Refills: 1 | Status: SHIPPED | OUTPATIENT
Start: 2024-12-17

## 2024-12-17 NOTE — PSYCH
After connecting through [employer], patient was verified with two unique identifiers. Patient confirmed that they were at Home/Office and were in [place] at the time of the appointment.    Patient (or authorized legal representative) was then informed that this was a Telemedicine visit and that the exam was being conducted confidentially over secure lines. My office door was closed.  No one else was in the room with me.  Patient acknowledged consent and understanding of privacy and security of the Telemedicine visit, and gave permission to have a telemedicine presenter stay in the room in order to assist with the history and to conduct the exam as needed.  I informed the patient that I have reviewed their record in Epic and presented the opportunity for them to ask any questions regarding the visit today.  The patient agreed to participate.    Chief Complaint:    Depression and anxiety    Interval Summary    Pt said that he is doing okay since his last visit. He said he has been less depressed, few times a week, less intense and able to cope much better with it. He is anxious few times a week and coping well with it. He said that he is still stressed about his living situation and finances. They plan to spend [place] with family and exchanging gifts. He is still shopping  for gifts for his family.    Psychiatric ROS    Sleep: Better, 8 hours, wakes up 1-2 times, feels rested at times  Appetite: Good  Suicidal ideation: Denied  Homicidal ideation: Denied  Psychosis: None reported  Chandni: None reported  PTSD: None reported  Panic attacks: None reported  Memory issues: None reported  Med compliance: Good  Side effects: None reported    D+A Use    Alcohol: Sober since 12/4/2020  Nicotine: Vapes nicotine, 6 mg cartridge last 3 days  Other substances: None    Medical Issues  Acute medical issues at present: None  Last PCP Visit: More than a year ago, no issues    Medical Review of Systems    No  "fever/dizziness/blurred vision/cough/chest pain/shortness of breath/abdominal pain/nausea/vomiting/bladder or bowel problems/headache/weakness/rigidity or pain reported. Any symptoms reported by patient are listed in the interval summary.    Mental Status Exam    Motor: Normal gait and no abnormal movements  General appearance and behavior: Moderately kempt, calm, cooperative, pleasant, good eye contact, good rapport, no psychomotor abnormalities noted  Speech: Spontaneous with normal rate, normal volume and normal tone  Mood: \"Okay\"  Affect: Depressed, congruent with mood, normal range, appropriate  Thought Process: linear  Thought content: Denied suicidal or homicidal ideation. No delusions elicited  Perception: Denied any auditory or visual hallucinations  Insight: Good  Judgment: Good  Orientation: Oriented to person, place and time  Attention/Concentration: Good  Memory - Grossly intact  Language - Fluent  Fund of Knowledge - Adequate    Assessment and Plan    Major depressive disorder, recurrent, moderate    - Continue aripiprazole 10 mg PO daily with breakfast.    - Continue duloxetine 30 mg PO daily with breakfast.     Generalized anxiety disorder - Continue hydroxyzine 50 mg PO TID PRN anxiety. Taking all 3 a day in the last few weeks.    - Continue gabapentin 400 mg PO TID    - Continue buspirone 10 mg PO TID with meals    Posttraumatic stress disorder - Continue Prazosin 1 mg PO HS for nightmares and tolerating well    Insomnia unspecified - Continue trazodone 200 mg PO HS. This helps him sleep. Tolerating 200 mg well    Advised to request for therapy and to added to the waiting list at [facility]. He has not heard back yet. He has not found therapists doing after hour appts and he cannot take off from work during the day.    The clinical diagnosis, course and prognosis were explained to the patient. Discussed with patient the clinical indications, interactions, benefits, the most common and serious side " effects of all current medications. The alternative treatment options were discussed. The importance of continuing in psychotherapy was reinstated. The patient was receptive and appeared to understand the information provided. Patient's concerns and questions were addressed to patient's satisfaction during the appointment, and the patient agreed to the treatment plan.    Patient is aware of adverse effects of use of alcohol and/or other substances on mental illness and is aware of the risk of combining alcohol and/or substances with the medications that are currently prescribed. The patient was advised go to the nearest emergency room or call 911 if new symptoms arise or existing symptoms worsen or has thoughts of hurting self or others.    Follow up in 4 weeks    Behavioral Health OP Treatment Plan      Name and Date of Birth:  Misael Grace 34 y.o. 1990    Date of Treatment Plan: December 17, 2024    Diagnosis/Diagnoses:     1. Major depressive disorder, recurrent, moderate (HCC)    2. Generalized anxiety disorder    3. Post traumatic stress disorder (PTSD)    4. Insomnia, unspecified type          Strengths/Personal Resources for Self-Care: Good insight, compliant with treatment, good family support    Area/Areas of need (in own words):    1. Long Term Goal: Management of symptoms  Target Date: 6 months  Person/Persons responsible for completion of goal: Patient    2.  Short Term Objective(s):  How will we reach this goal - Continue medications as prescribed  Target Date: 6 months  Person/Persons Responsible for Completion of Goal: Patient    Progress Towards Goals: Stable    Treatment Modality: Medication management every two months    Review due in 180 days from the date of this plan    Expected length of service: Follow up appointment every 1-2 months for maintenance    My Physician/PA/NP and I have developed this plan together and I agree to work on the goals and objectives. I understand the treatment  goals that were developed for my treatment. I consent to the above treatment plan.    Follow up in 4 weeks    Start time: 520 PM    Stop time: 540 PM

## 2024-12-17 NOTE — BH CRISIS PLAN
Client Name: Misael Grace       Client YOB: 1990    Luzma Safety Plan      Creation Date: 10/29/24    Created By: Jose L Bay MD       Step 1: Warning Signs:   Warning Signs   Depression            Step 2: Internal Coping Strategies:   Internal Coping Strategies   Distraction   Play with pets, talk to family, plays video games            Step 3: People and social settings that provide distraction:   Name Contact Information   Wife and parents             Step 4: People whom I can ask for help during a crisis:      Name Contact Information    Wofe and parents       Step 5: Professionals or agencies I can contact during a crisis:      Clinican/Agency Name Phone Emergency Contact    [employer]          Crisis Phone Numbers:   Suicide Prevention Lifeline: Call or Text  520 Crisis Text Line: Text HOME to 681-479   Please note: Some [facility] do not have a separate number for Child/Adolescent specific crisis. If your county is not listed under Child/Adolescent, please call the adult number for your county      Adult Crisis Numbers: Child/Adolescent Crisis Numbers   [facility]: [phone] [facility]: [phone]   [facility]: [phone] [facility]: [phone]   [facility]: [phone] [place], [place]: [phone]   [facility]: [phone] [facility]: [phone]   [facility]: [phone]   [facility]: [phone]   [facility]: [phone]   [facility]: [phone] (daytime) [phone] (after hours, weekends, holidays)      Step 6: Making the environment safer (plan for lethal means safety):      Optional: What is most important to me and worth living for?      Luzma Safety Plan. Alicia Steiner and Jose C Childs. Used with permission of the authors.

## 2024-12-30 ENCOUNTER — TELEPHONE (OUTPATIENT)
Dept: PSYCHIATRY | Facility: CLINIC | Age: 34
End: 2024-12-30

## 2024-12-30 NOTE — TELEPHONE ENCOUNTER
Patient called requesting refill for all 6 of his meds. Patient made aware medication was refilled on 12/ 17/2024 to [facility] pharmacy. Patient instructed to contact the pharmacy to obtain refills of medication. Patient verbalized understanding.

## 2025-01-20 DIAGNOSIS — F33.1 MAJOR DEPRESSIVE DISORDER, RECURRENT, MODERATE (HCC): ICD-10-CM

## 2025-01-20 NOTE — TELEPHONE ENCOUNTER
Reason for call:   [x] Refill   [] Prior Auth  [] Other:     Office:   [] PCP/Provider -   [x] Specialty/Provider - Jose L Bay MD / [facility]    Medication: DULoxetine (Cymbalta) 30 mg delayed release capsule / Take 1 capsule (30 mg total) by mouth daily with breakfast     Pharmacy: [facility] [address] [place] [address] ROMEO GORMAN     Does the patient have enough for 3 days?   [x] Yes   [] No - Send as HP to POD

## 2025-01-21 RX ORDER — DULOXETIN HYDROCHLORIDE 30 MG/1
30 CAPSULE, DELAYED RELEASE ORAL
Qty: 30 CAPSULE | Refills: 0 | Status: SHIPPED | OUTPATIENT
Start: 2025-01-21

## 2025-02-11 ENCOUNTER — TELEMEDICINE (OUTPATIENT)
Dept: PSYCHIATRY | Facility: CLINIC | Age: 35
End: 2025-02-11
Payer: COMMERCIAL

## 2025-02-11 DIAGNOSIS — F43.10 POST TRAUMATIC STRESS DISORDER (PTSD): ICD-10-CM

## 2025-02-11 DIAGNOSIS — G47.00 INSOMNIA, UNSPECIFIED TYPE: ICD-10-CM

## 2025-02-11 DIAGNOSIS — F41.1 GENERALIZED ANXIETY DISORDER: ICD-10-CM

## 2025-02-11 DIAGNOSIS — F33.1 MAJOR DEPRESSIVE DISORDER, RECURRENT, MODERATE (HCC): Primary | ICD-10-CM

## 2025-02-11 PROCEDURE — 99213 OFFICE O/P EST LOW 20 MIN: CPT | Performed by: PSYCHIATRY & NEUROLOGY

## 2025-02-11 NOTE — PSYCH
After connecting through [employer], patient was verified with two unique identifiers. Patient confirmed that they were at Home/Office and were in [place] at the time of the appointment.    Patient (or authorized legal representative) was then informed that this was a Telemedicine visit and that the exam was being conducted confidentially over secure lines. My office door was closed.  No one else was in the room with me.  Patient acknowledged consent and understanding of privacy and security of the Telemedicine visit, and gave permission to have a telemedicine presenter stay in the room in order to assist with the history and to conduct the exam as needed.  I informed the patient that I have reviewed their record in Epic and presented the opportunity for them to ask any questions regarding the visit today.  The patient agreed to participate.    Chief Complaint:    Depression and anxiety    Interval Summary    Pt said that he is doing fine since his last visit. He said that he has been much less depressed and less anxious, few times week, less intense, mostly situational and coping well with it. He said that he is still stressed about his living situation and finances. He said that he has less hours at work and his company is not doing well and he is worried about losing his job. He is doing well at home. He said that he did good during the holidays. He said that his overtime has been cut and he is not able to save money.    Psychiatric ROS    Sleep: Better, 7-8 hours, wakes up 1-2 times, feels rested at times  Appetite: Good, gaining weight  Suicidal ideation: Denied  Homicidal ideation: Denied  Psychosis: None reported  Chandni: None reported  PTSD: None reported  Panic attacks: None reported  Memory issues: None reported  Med compliance: Good  Side effects: None reported    D+A Use    Alcohol: Sober since 12/4/2020  Nicotine: Vapes nicotine, 6 mg cartridge last 3 days  Other substances: None    Medical  "Issues  Acute medical issues at present: None  Last PCP Visit: More than a year ago, no issues    Medical Review of Systems    No fever/dizziness/blurred vision/cough/chest pain/shortness of breath/abdominal pain/nausea/vomiting/bladder or bowel problems/headache/weakness/rigidity or pain reported. Any symptoms reported by patient are listed in the interval summary.    Mental Status Exam    Motor: Normal gait and no abnormal movements  General appearance and behavior: Moderately kempt, calm, cooperative, pleasant, good eye contact, good rapport, no psychomotor abnormalities noted  Speech: Spontaneous with normal rate, normal volume and normal tone  Mood: \"Okay\"  Affect: Depressed, congruent with mood, normal range, appropriate  Thought Process: linear  Thought content: Denied suicidal or homicidal ideation. No delusions elicited  Perception: Denied any auditory or visual hallucinations  Insight: Good  Judgment: Good  Orientation: Oriented to person, place and time  Attention/Concentration: Good  Memory - Grossly intact  Language - Fluent  Fund of Knowledge - Adequate    Assessment and Plan    Major depressive disorder, recurrent, moderate    - Continue aripiprazole 10 mg PO daily with breakfast.    - Continue duloxetine 30 mg PO daily with breakfast.     Generalized anxiety disorder - Continue hydroxyzine 50 mg PO TID PRN anxiety. Taking all 3 a day in the last few weeks.    - Continue gabapentin 400 mg PO TID    - Continue buspirone 10 mg PO TID with meals    Posttraumatic stress disorder - Continue Prazosin 1 mg PO HS for nightmares and tolerating well    Insomnia unspecified - Continue trazodone 200 mg PO HS. This helps him sleep. Tolerating 200 mg well    Advised to request for therapy and to added to the waiting list at [facility]. He has not heard back yet. He has not found therapists doing after hour appts and he cannot take off from work during the day.    The clinical diagnosis, course and prognosis were " explained to the patient. Discussed with patient the clinical indications, interactions, benefits, the most common and serious side effects of all current medications. The alternative treatment options were discussed. The importance of continuing in psychotherapy was reinstated. The patient was receptive and appeared to understand the information provided. Patient's concerns and questions were addressed to patient's satisfaction during the appointment, and the patient agreed to the treatment plan.    Patient is aware of adverse effects of use of alcohol and/or other substances on mental illness and is aware of the risk of combining alcohol and/or substances with the medications that are currently prescribed. The patient was advised go to the nearest emergency room or call 911 if new symptoms arise or existing symptoms worsen or has thoughts of hurting self or others.    Follow up in 4 weeks    Behavioral Health OP Treatment Plan      Name and Date of Birth:  Misael Grace 34 y.o. 1990    Date of Treatment Plan: February 11, 2025    Diagnosis/Diagnoses:     1. Major depressive disorder, recurrent, moderate (HCC)    2. Generalized anxiety disorder    3. Post traumatic stress disorder (PTSD)    4. Insomnia, unspecified type          Strengths/Personal Resources for Self-Care: Good insight, compliant with treatment, good family support    Area/Areas of need (in own words):    1. Long Term Goal: Management of symptoms  Target Date: 6 months  Person/Persons responsible for completion of goal: Patient    2.  Short Term Objective(s):  How will we reach this goal - Continue medications as prescribed  Target Date: 6 months  Person/Persons Responsible for Completion of Goal: Patient    Progress Towards Goals: Stable    Treatment Modality: Medication management every two months    Review due in 180 days from the date of this plan    Expected length of service: Follow up appointment every 1-2 months for maintenance    My  Physician/PA/NP and I have developed this plan together and I agree to work on the goals and objectives. I understand the treatment goals that were developed for my treatment. I consent to the above treatment plan.    Follow up in 4 weeks    Start time: 520 PM    Stop time: 540 PM

## 2025-02-11 NOTE — BH CRISIS PLAN
Client Name: Misael Grace       Client YOB: 1990    Luzma Safety Plan      Creation Date: 10/29/24    Created By: Jose L Bay MD       Step 1: Warning Signs:   Warning Signs   Depression            Step 2: Internal Coping Strategies:   Internal Coping Strategies   Distraction   Play with pets, talk to family, plays video games            Step 3: People and social settings that provide distraction:   Name Contact Information   Wife and parents             Step 4: People whom I can ask for help during a crisis:      Name Contact Information    Wofe and parents       Step 5: Professionals or agencies I can contact during a crisis:      Clinican/Agency Name Phone Emergency Contact    [employer]          Crisis Phone Numbers:   Suicide Prevention Lifeline: Call or Text  191 Crisis Text Line: Text HOME to 243-250   Please note: Some [facility] do not have a separate number for Child/Adolescent specific crisis. If your county is not listed under Child/Adolescent, please call the adult number for your county      Adult Crisis Numbers: Child/Adolescent Crisis Numbers   [facility]: [phone] [facility]: [phone]   [facility]: [phone] [facility]: [phone]   [facility]: [phone] [place], [place]: [phone]   [facility]: [phone] [facility]: [phone]   [facility]: [phone]   [facility]: [phone]   [facility]: [phone]   [facility]: [phone] (daytime) [phone] (after hours, weekends, holidays)      Step 6: Making the environment safer (plan for lethal means safety):      Optional: What is most important to me and worth living for?      Luzma Safety Plan. Alicia Steiner and Jose C Childs. Used with permission of the authors.

## 2025-02-20 DIAGNOSIS — F33.1 MAJOR DEPRESSIVE DISORDER, RECURRENT, MODERATE (HCC): ICD-10-CM

## 2025-02-20 RX ORDER — DULOXETIN HYDROCHLORIDE 30 MG/1
30 CAPSULE, DELAYED RELEASE ORAL
Qty: 30 CAPSULE | Refills: 1 | Status: SHIPPED | OUTPATIENT
Start: 2025-02-20

## 2025-02-20 NOTE — TELEPHONE ENCOUNTER
Reason for call:   [x] Refill   [] Prior Auth  [] Other:     Office:   [] PCP/Provider -   [x] Specialty/Provider - Psych     Medication:   - Duloxetine 30mg- take 1 capsule by mouth daily with breakfast      Pharmacy: [facility] [place]    Does the patient have enough for 3 days?   [x] Yes   [] No - Send as HP to POD

## 2025-03-17 DIAGNOSIS — Z79.899 ENCOUNTER FOR LONG-TERM (CURRENT) USE OF MEDICATIONS: Primary | ICD-10-CM

## 2025-04-08 ENCOUNTER — TELEMEDICINE (OUTPATIENT)
Dept: PSYCHIATRY | Facility: CLINIC | Age: 35
End: 2025-04-08
Payer: COMMERCIAL

## 2025-04-08 DIAGNOSIS — F41.1 GENERALIZED ANXIETY DISORDER: ICD-10-CM

## 2025-04-08 DIAGNOSIS — F33.1 MODERATE RECURRENT MAJOR DEPRESSION (HCC): ICD-10-CM

## 2025-04-08 DIAGNOSIS — G47.00 INSOMNIA, UNSPECIFIED TYPE: ICD-10-CM

## 2025-04-08 DIAGNOSIS — F41.1 GAD (GENERALIZED ANXIETY DISORDER): ICD-10-CM

## 2025-04-08 DIAGNOSIS — F33.1 MAJOR DEPRESSIVE DISORDER, RECURRENT, MODERATE (HCC): Primary | ICD-10-CM

## 2025-04-08 DIAGNOSIS — F43.10 POST TRAUMATIC STRESS DISORDER (PTSD): ICD-10-CM

## 2025-04-08 PROCEDURE — 99213 OFFICE O/P EST LOW 20 MIN: CPT | Performed by: PSYCHIATRY & NEUROLOGY

## 2025-04-08 RX ORDER — HYDROXYZINE HYDROCHLORIDE 50 MG/1
50 TABLET, FILM COATED ORAL 2 TIMES DAILY PRN
Qty: 180 TABLET | Refills: 1 | Status: SHIPPED | OUTPATIENT
Start: 2025-04-08

## 2025-04-08 RX ORDER — DULOXETIN HYDROCHLORIDE 30 MG/1
30 CAPSULE, DELAYED RELEASE ORAL
Qty: 30 CAPSULE | Refills: 1 | Status: SHIPPED | OUTPATIENT
Start: 2025-04-08

## 2025-04-08 RX ORDER — GABAPENTIN 400 MG/1
400 CAPSULE ORAL 3 TIMES DAILY
Qty: 270 CAPSULE | Refills: 1 | Status: SHIPPED | OUTPATIENT
Start: 2025-04-08

## 2025-04-08 RX ORDER — BUSPIRONE HYDROCHLORIDE 10 MG/1
10 TABLET ORAL
Qty: 270 TABLET | Refills: 1 | Status: SHIPPED | OUTPATIENT
Start: 2025-04-08

## 2025-04-08 RX ORDER — TRAZODONE HYDROCHLORIDE 100 MG/1
200 TABLET ORAL
Qty: 180 TABLET | Refills: 1 | Status: SHIPPED | OUTPATIENT
Start: 2025-04-08

## 2025-04-08 RX ORDER — ARIPIPRAZOLE 10 MG/1
10 TABLET ORAL DAILY
Qty: 90 TABLET | Refills: 1 | Status: SHIPPED | OUTPATIENT
Start: 2025-04-08

## 2025-04-08 NOTE — PSYCH
Administrative Statements   Encounter provider Jose L Bay MD    The Patient is located at Home and in the following state in which I hold an active license [place].    The patient was identified by name and date of birth. Misael Grace was informed that this is a telemedicine visit and that the visit is being conducted through the [employer] platform. He agrees to proceed..  My office door was closed. No one else was in the room.  He acknowledged consent and understanding of privacy and security of the video platform. The patient has agreed to participate and understands they can discontinue the visit at any time.    I have spent a total time of 25 minutes in caring for this patient on the day of the visit/encounter including Risks and benefits of tx options, Counseling / Coordination of care, Documenting in the medical record, and Obtaining or reviewing history  .     Chief Complaint:    Depression and anxiety    Interval Summary    Pt said that he is doing okay since his last visit. He denied feeling depressed or anxious in the last few weeks. He said that he is stressed at work, paying bills and living situation. He is doing well at work and coping well. He is doing well at home.     Psychiatric ROS    Sleep: Better, 8 hours, wakes up 1-2 times, feels rested at times  Appetite: Good, weight is stable  Suicidal ideation: Denied  Homicidal ideation: Denied  Psychosis: None reported  Chandni: None reported  PTSD: None reported  Panic attacks: None reported  Memory issues: None reported  Med compliance: Good  Side effects: None reported    D+A Use    Alcohol: Sober since 12/4/2020  Nicotine: Vapes nicotine, 6 mg cartridge last 3 days  Other substances: None    Medical Issues  Acute medical issues at present: None  Last PCP Visit: More than a year ago, no issues    Medical Review of Systems    No fever/dizziness/blurred vision/cough/chest pain/shortness of breath/abdominal pain/nausea/vomiting/bladder or bowel  "problems/headache/weakness/rigidity or pain reported. Any symptoms reported by patient are listed in the interval summary.    Mental Status Exam    Motor: Normal gait and no abnormal movements  General appearance and behavior: Moderately kempt, calm, cooperative, pleasant, good eye contact, good rapport, no psychomotor abnormalities noted  Speech: Spontaneous with normal rate, normal volume and normal tone  Mood: \"Okay\"  Affect: Depressed, congruent with mood, normal range, appropriate  Thought Process: linear  Thought content: Denied suicidal or homicidal ideation. No delusions elicited  Perception: Denied any auditory or visual hallucinations  Insight: Good  Judgment: Good  Orientation: Oriented to person, place and time  Attention/Concentration: Good  Memory - Grossly intact  Language - Fluent  Fund of Knowledge - Adequate    Assessment and Plan    Major depressive disorder, recurrent, moderate    - Continue aripiprazole 10 mg PO daily with breakfast.    - Continue duloxetine 30 mg PO daily with breakfast.     Generalized anxiety disorder - Continue hydroxyzine 50 mg PO TID PRN anxiety. Taking all 3 a day in the last few weeks.    - Continue gabapentin 400 mg PO TID    - Continue buspirone 10 mg PO TID with meals    Posttraumatic stress disorder - Continue Prazosin 1 mg PO HS for nightmares and tolerating well    Insomnia unspecified - Continue trazodone 200 mg PO HS. This helps him sleep. Tolerating 200 mg well    Advised to request for therapy and to added to the waiting list at [facility]. He has not heard back yet. He has not found therapists doing after hour appts and he cannot take off from work during the day.    Therapy - None currently    The clinical diagnosis, course and prognosis were explained to the patient. Discussed with patient the clinical indications, interactions, benefits, the most common and serious side effects of all current medications. The alternative treatment options were discussed. The " importance of continuing in psychotherapy was reinstated. The patient was receptive and appeared to understand the information provided. Patient's concerns and questions were addressed to patient's satisfaction during the appointment, and the patient agreed to the treatment plan.    Patient is aware of adverse effects of use of alcohol and/or other substances on mental illness and is aware of the risk of combining alcohol and/or substances with the medications that are currently prescribed. The patient was advised go to the nearest emergency room or call 911 if new symptoms arise or existing symptoms worsen or has thoughts of hurting self or others.    Follow up in 4 weeks    Start time: 445 PM    Stop time: 510 PM

## 2025-04-08 NOTE — BH CRISIS PLAN
Client Name: Misael Grace       Client YOB: 1990    Luzma Safety Plan      Creation Date: 10/29/24    Created By: Jose L Bay MD       Step 1: Warning Signs:   Warning Signs   Depression            Step 2: Internal Coping Strategies:   Internal Coping Strategies   Distraction   Play with pets, talk to family, plays video games            Step 3: People and social settings that provide distraction:   Name Contact Information   Wife and parents             Step 4: People whom I can ask for help during a crisis:      Name Contact Information    Wofe and parents       Step 5: Professionals or agencies I can contact during a crisis:      Clinican/Agency Name Phone Emergency Contact    [employer]          Crisis Phone Numbers:   Suicide Prevention Lifeline: Call or Text  507 Crisis Text Line: Text HOME to 994-026   Please note: Some [facility] do not have a separate number for Child/Adolescent specific crisis. If your county is not listed under Child/Adolescent, please call the adult number for your county      Adult Crisis Numbers: Child/Adolescent Crisis Numbers   [facility]: [phone] [facility]: [phone]   [facility]: [phone] [facility]: [phone]   [facility]: [phone] [place], [place]: [phone]   [facility]: [phone] [facility]: [phone]   [facility]: [phone]   [facility]: [phone]   [facility]: [phone]   [place] [facility]: [phone] (daytime) [phone] (after hours, weekends, holidays)      Step 6: Making the environment safer (plan for lethal means safety):      Optional: What is most important to me and worth living for?      Luzma Safety Plan. Alicia Steiner and Jose C Childs. Used with permission of the authors.

## 2025-04-08 NOTE — BH TREATMENT PLAN
TREATMENT PLAN (Medication Management Only)        [facility]    Name and Date of Birth:  Misael Grace 35 y.o. 1990  MRN: [id number]  Date of Treatment Plan: April 8, 2025  Diagnosis/Diagnoses:    1. Major depressive disorder, recurrent, moderate (HCC)    2. Generalized anxiety disorder    3. Post traumatic stress disorder (PTSD)    4. Insomnia, unspecified type      Strengths/Personal Resources for Self-Care: supportive family, taking medications as prescribed.  Area/Areas of need (in own words): depression  1. Long Term Goal:   continue improvement in depression.  Target Date:6 months - 10/8/2025  Person/Persons responsible for completion of goal: Misael  2.  Short Term Objective (s) - How will we reach this goal?:   A.  Provider new recommended medication/dosage changes and/or continue medication(s): continue current medications as prescribed.  B.  N/A.  C.  N/A.  Target Date:6 months - 10/8/2025  Person/Persons Responsible for Completion of Goal: Misael  Progress Towards Goals: continuing treatment  Treatment Modality: medication management every 2 months  Review due 180 days from date of this plan: 6 months - 10/8/2025  Expected length of service: ongoing treatment unless revised  My Physician/PA/NP and I have developed this plan together and I agree to work on the goals and objectives. I understand the treatment goals that were developed for my treatment.   Electronic Signatures: on file (unless signed below)    Jose L Bay MD 04/08/25

## 2025-05-30 ENCOUNTER — TELEPHONE (OUTPATIENT)
Dept: PSYCHIATRY | Facility: CLINIC | Age: 35
End: 2025-05-30

## 2025-05-30 NOTE — TELEPHONE ENCOUNTER
Writer spoke to client about signing New Patient forms and No Show/Late policy in [facility] prior to appointment with Dr. Bay on 6/3/2025.

## 2025-06-03 ENCOUNTER — TELEMEDICINE (OUTPATIENT)
Dept: PSYCHIATRY | Facility: CLINIC | Age: 35
End: 2025-06-03
Payer: COMMERCIAL

## 2025-06-03 DIAGNOSIS — F33.1 MAJOR DEPRESSIVE DISORDER, RECURRENT, MODERATE (HCC): Primary | ICD-10-CM

## 2025-06-03 DIAGNOSIS — F41.1 GENERALIZED ANXIETY DISORDER: ICD-10-CM

## 2025-06-03 DIAGNOSIS — G47.00 INSOMNIA, UNSPECIFIED TYPE: ICD-10-CM

## 2025-06-03 DIAGNOSIS — F43.10 POST TRAUMATIC STRESS DISORDER (PTSD): ICD-10-CM

## 2025-06-03 PROCEDURE — 98005 SYNCH AUDIO-VIDEO EST LOW 20: CPT | Performed by: PSYCHIATRY & NEUROLOGY

## 2025-06-03 NOTE — PSYCH
Administrative Statements   Encounter provider Jose L Bay MD    The Patient is located at Home and in the following state in which I hold an active license [place].    The patient was identified by name and date of birth. Misael Grace was informed that this is a telemedicine visit and that the visit is being conducted through the [employer] platform. He agrees to proceed..  My office door was closed. No one else was in the room.  He acknowledged consent and understanding of privacy and security of the video platform. The patient has agreed to participate and understands they can discontinue the visit at any time.    I have spent a total time of 15 minutes in caring for this patient on the day of the visit/encounter including Risks and benefits of tx options, Counseling / Coordination of care, Documenting in the medical record, and Obtaining or reviewing history  .     Chief Complaint:    Depression and anxiety    Interval Summary    Pt said that he is doing pretty good since his last visit. He denied feeling depressed or anxious in the last few weeks. He said that he is stressed with his life at times, but coping with it. He is stressed at work, paying bills and living situation. He said that he is able to restart overtime hours at his work place and he is happy about that as he is able to put money aside for buying a house. He is doing well at home and hi wife has been supportive. He denied any other stressors.    Psychiatric ROS    Sleep: Better, 7-8 hours, feels rested mostly, does not wake up much  Appetite: Good, weight is stable  Suicidal ideation: Denied  Homicidal ideation: Denied  Psychosis: None reported  Chandni: None reported  PTSD: None reported  Panic attacks: None reported  Memory issues: None reported  Med compliance: Good  Side effects: None reported    D+A Use    Alcohol: Sober since 12/4/2020  Nicotine: Vapes nicotine, 6 mg cartridge last 3 days  Other substances: None    Medical  "Issues  Acute medical issues at present: None  Last PCP Visit: More than a year ago, no issues    Medical Review of Systems    No fever/dizziness/blurred vision/cough/chest pain/shortness of breath/abdominal pain/nausea/vomiting/bladder or bowel problems/headache/weakness/rigidity or pain reported. Any symptoms reported by patient are listed in the interval summary.    Mental Status Exam    Motor: Normal gait and no abnormal movements  General appearance and behavior: Moderately kempt, calm, cooperative, pleasant, good eye contact, good rapport, no psychomotor abnormalities noted  Speech: Spontaneous with normal rate, normal volume and normal tone  Mood: \"Good\"  Affect: Euthymic, congruent with mood, normal range, appropriate  Thought Process: linear  Thought content: Denied suicidal or homicidal ideation. No delusions elicited  Perception: Denied any auditory or visual hallucinations  Insight: Good  Judgment: Good  Orientation: Oriented to person, place and time  Attention/Concentration: Good  Memory - Grossly intact  Language - Fluent  Fund of Knowledge - Adequate    Assessment and Plan    Major depressive disorder, recurrent, moderate    - Continue aripiprazole 10 mg PO daily with breakfast.    - Continue duloxetine 30 mg PO daily with breakfast.     Generalized anxiety disorder - Continue hydroxyzine 50 mg PO TID PRN anxiety. Taking all 3 a day in the last few weeks.    - Continue gabapentin 400 mg PO TID    - Continue buspirone 10 mg PO TID with meals    Posttraumatic stress disorder - Continue Prazosin 1 mg PO HS for nightmares and tolerating well    Insomnia unspecified - Continue trazodone 200 mg PO HS. This helps him sleep. Tolerating 200 mg well    Advised to request for therapy and to added to the waiting list at [facility]. He has not heard back yet. He has not found therapists doing after hour appts and he cannot take off from work during the day.    Therapy - None currently    The clinical diagnosis, course " and prognosis were explained to the patient. Discussed with patient the clinical indications, interactions, benefits, the most common and serious side effects of all current medications. The alternative treatment options were discussed. The importance of continuing in psychotherapy was reinstated. The patient was receptive and appeared to understand the information provided. Patient's concerns and questions were addressed to patient's satisfaction during the appointment, and the patient agreed to the treatment plan.    Patient is aware of adverse effects of use of alcohol and/or other substances on mental illness and is aware of the risk of combining alcohol and/or substances with the medications that are currently prescribed. The patient was advised go to the nearest emergency room or call 911 if new symptoms arise or existing symptoms worsen or has thoughts of hurting self or others.    Follow up in 4 weeks    Start time: 720 PM    Stop time: 735 PM

## 2025-06-27 ENCOUNTER — TELEPHONE (OUTPATIENT)
Dept: OTHER | Facility: OTHER | Age: 35
End: 2025-06-27

## 2025-06-27 DIAGNOSIS — F33.1 MAJOR DEPRESSIVE DISORDER, RECURRENT, MODERATE (HCC): ICD-10-CM

## 2025-06-27 NOTE — TELEPHONE ENCOUNTER
Medication Refill Request       Medication:   DULoxetine (Cymbalta) 30 mg delayed release capsule     Dose/Frequency:  Take 1 capsule (30 mg total) by mouth daily with breakfast     Quantity: 30    Pharmacy: [facility] [address] [place] [address]  Phone: [phone]  Fax: [phone]      Office:   [] PCP/Provider -   [x] Specialty/Provider -     Does the patient have enough for 3 days?   [x] Yes   [] No - Send as HP to POD    Is the patient completely out of the medication or does not have enough until the next business day?  [] Yes - send to Call Hub  [x] No - Send as HP to POD

## 2025-07-01 RX ORDER — DULOXETIN HYDROCHLORIDE 30 MG/1
30 CAPSULE, DELAYED RELEASE ORAL
Qty: 30 CAPSULE | Refills: 3 | Status: SHIPPED | OUTPATIENT
Start: 2025-07-01

## 2025-07-29 ENCOUNTER — TELEMEDICINE (OUTPATIENT)
Dept: PSYCHIATRY | Facility: CLINIC | Age: 35
End: 2025-07-29
Payer: COMMERCIAL

## 2025-07-29 DIAGNOSIS — G47.00 INSOMNIA, UNSPECIFIED TYPE: ICD-10-CM

## 2025-07-29 DIAGNOSIS — F43.10 POST TRAUMATIC STRESS DISORDER (PTSD): ICD-10-CM

## 2025-07-29 DIAGNOSIS — F41.1 GENERALIZED ANXIETY DISORDER: ICD-10-CM

## 2025-07-29 DIAGNOSIS — F33.1 MAJOR DEPRESSIVE DISORDER, RECURRENT, MODERATE (HCC): Primary | ICD-10-CM

## 2025-07-29 PROCEDURE — 99213 OFFICE O/P EST LOW 20 MIN: CPT | Performed by: PSYCHIATRY & NEUROLOGY
